# Patient Record
Sex: MALE | Race: WHITE | Employment: OTHER | URBAN - METROPOLITAN AREA
[De-identification: names, ages, dates, MRNs, and addresses within clinical notes are randomized per-mention and may not be internally consistent; named-entity substitution may affect disease eponyms.]

---

## 2018-04-26 ENCOUNTER — OFFICE VISIT (OUTPATIENT)
Dept: DERMATOLOGY | Age: 78
End: 2018-04-26

## 2018-04-26 DIAGNOSIS — L82.1 SEBORRHEIC KERATOSES: ICD-10-CM

## 2018-04-26 DIAGNOSIS — D48.9 NEOPLASM OF UNCERTAIN BEHAVIOR: Primary | ICD-10-CM

## 2018-04-26 DIAGNOSIS — D22.9 MULTIPLE BENIGN MELANOCYTIC NEVI: ICD-10-CM

## 2018-04-26 DIAGNOSIS — L57.8 DIFFUSE PHOTOAGING OF SKIN: ICD-10-CM

## 2018-04-26 DIAGNOSIS — Z85.828 HISTORY OF NONMELANOMA SKIN CANCER: ICD-10-CM

## 2018-04-26 DIAGNOSIS — L57.0 MULTIPLE ACTINIC KERATOSES: ICD-10-CM

## 2018-04-26 DIAGNOSIS — Z78.9 NON-TOBACCO USER: ICD-10-CM

## 2018-04-26 PROCEDURE — 1123F ACP DISCUSS/DSCN MKR DOCD: CPT | Performed by: DERMATOLOGY

## 2018-04-26 PROCEDURE — 4040F PNEUMOC VAC/ADMIN/RCVD: CPT | Performed by: DERMATOLOGY

## 2018-04-26 PROCEDURE — G8427 DOCREV CUR MEDS BY ELIG CLIN: HCPCS | Performed by: DERMATOLOGY

## 2018-04-26 PROCEDURE — 99203 OFFICE O/P NEW LOW 30 MIN: CPT | Performed by: DERMATOLOGY

## 2018-04-26 PROCEDURE — 17004 DESTROY PREMAL LESIONS 15/>: CPT | Performed by: DERMATOLOGY

## 2018-04-26 PROCEDURE — G8421 BMI NOT CALCULATED: HCPCS | Performed by: DERMATOLOGY

## 2018-04-26 PROCEDURE — 11101 PR BIOPSY, EACH ADDED LESION: CPT | Performed by: DERMATOLOGY

## 2018-04-26 PROCEDURE — 1036F TOBACCO NON-USER: CPT | Performed by: DERMATOLOGY

## 2018-04-26 PROCEDURE — 11100 PR BIOPSY OF SKIN LESION: CPT | Performed by: DERMATOLOGY

## 2018-04-26 RX ORDER — QUINAPRIL HCL AND HYDROCHLOROTHIAZIDE 20; 12.5 MG/1; MG/1
TABLET ORAL
COMMUNITY
Start: 2018-02-15

## 2018-04-26 RX ORDER — METFORMIN HYDROCHLORIDE 500 MG/1
TABLET, EXTENDED RELEASE ORAL
COMMUNITY
Start: 2018-02-14

## 2018-04-26 RX ORDER — GABAPENTIN 300 MG/1
CAPSULE ORAL
COMMUNITY
Start: 2016-09-27

## 2018-04-26 RX ORDER — IBUPROFEN 200 MG
400 TABLET ORAL 2 TIMES DAILY
COMMUNITY

## 2018-05-01 ENCOUNTER — TELEPHONE (OUTPATIENT)
Dept: DERMATOLOGY | Age: 78
End: 2018-05-01

## 2018-05-01 DIAGNOSIS — C44.91 BASAL CELL CARCINOMA, UNSPECIFIED SITE: Primary | ICD-10-CM

## 2018-05-01 DIAGNOSIS — C44.92 SCC (SQUAMOUS CELL CARCINOMA): ICD-10-CM

## 2018-05-24 ENCOUNTER — PROCEDURE VISIT (OUTPATIENT)
Dept: SURGERY | Age: 78
End: 2018-05-24

## 2018-05-24 VITALS
WEIGHT: 218 LBS | DIASTOLIC BLOOD PRESSURE: 77 MMHG | SYSTOLIC BLOOD PRESSURE: 160 MMHG | HEART RATE: 60 BPM | OXYGEN SATURATION: 96 % | TEMPERATURE: 97.8 F

## 2018-05-24 DIAGNOSIS — C44.311 BASAL CELL CARCINOMA OF NOSE: Primary | ICD-10-CM

## 2018-05-24 PROCEDURE — 17311 MOHS 1 STAGE H/N/HF/G: CPT | Performed by: DERMATOLOGY

## 2018-05-24 PROCEDURE — 14060 TIS TRNFR E/N/E/L 10 SQ CM/<: CPT | Performed by: DERMATOLOGY

## 2018-05-24 PROCEDURE — 17312 MOHS ADDL STAGE: CPT | Performed by: DERMATOLOGY

## 2018-05-25 ENCOUNTER — TELEPHONE (OUTPATIENT)
Dept: SURGERY | Age: 78
End: 2018-05-25

## 2018-05-31 ENCOUNTER — PROCEDURE VISIT (OUTPATIENT)
Dept: SURGERY | Age: 78
End: 2018-05-31

## 2018-05-31 VITALS
DIASTOLIC BLOOD PRESSURE: 78 MMHG | OXYGEN SATURATION: 96 % | WEIGHT: 216 LBS | HEART RATE: 66 BPM | SYSTOLIC BLOOD PRESSURE: 148 MMHG | TEMPERATURE: 97.9 F

## 2018-05-31 DIAGNOSIS — C44.319 BASAL CELL CARCINOMA OF LEFT CHEEK: ICD-10-CM

## 2018-05-31 DIAGNOSIS — Z48.02 VISIT FOR SUTURE REMOVAL: ICD-10-CM

## 2018-05-31 DIAGNOSIS — C44.619 BASAL CELL CARCINOMA OF LEFT HAND: Primary | ICD-10-CM

## 2018-05-31 PROCEDURE — 17312 MOHS ADDL STAGE: CPT | Performed by: DERMATOLOGY

## 2018-05-31 PROCEDURE — 12042 INTMD RPR N-HF/GENIT2.6-7.5: CPT | Performed by: DERMATOLOGY

## 2018-05-31 PROCEDURE — 17311 MOHS 1 STAGE H/N/HF/G: CPT | Performed by: DERMATOLOGY

## 2018-05-31 PROCEDURE — 13132 CMPLX RPR F/C/C/M/N/AX/G/H/F: CPT | Performed by: DERMATOLOGY

## 2018-06-01 ENCOUNTER — TELEPHONE (OUTPATIENT)
Dept: SURGERY | Age: 78
End: 2018-06-01

## 2018-06-07 ENCOUNTER — PROCEDURE VISIT (OUTPATIENT)
Dept: SURGERY | Age: 78
End: 2018-06-07

## 2018-06-07 VITALS
OXYGEN SATURATION: 97 % | TEMPERATURE: 98.3 F | HEART RATE: 64 BPM | SYSTOLIC BLOOD PRESSURE: 138 MMHG | DIASTOLIC BLOOD PRESSURE: 79 MMHG | WEIGHT: 216 LBS

## 2018-06-07 DIAGNOSIS — C44.329 SQUAMOUS CELL CARCINOMA OF SKIN OF RIGHT CHEEK: Primary | ICD-10-CM

## 2018-06-07 DIAGNOSIS — D04.4 SQUAMOUS CELL CARCINOMA IN SITU OF SCALP: ICD-10-CM

## 2018-06-07 PROCEDURE — 12052 INTMD RPR FACE/MM 2.6-5.0 CM: CPT | Performed by: DERMATOLOGY

## 2018-06-07 PROCEDURE — 17311 MOHS 1 STAGE H/N/HF/G: CPT | Performed by: DERMATOLOGY

## 2018-06-07 PROCEDURE — 12041 INTMD RPR N-HF/GENIT 2.5CM/<: CPT | Performed by: DERMATOLOGY

## 2018-06-07 RX ORDER — CEPHALEXIN 500 MG/1
CAPSULE ORAL
Qty: 21 CAPSULE | Refills: 0 | Status: SHIPPED | OUTPATIENT
Start: 2018-06-07 | End: 2018-11-01 | Stop reason: ALTCHOICE

## 2018-06-14 ENCOUNTER — NURSE ONLY (OUTPATIENT)
Dept: SURGERY | Age: 78
End: 2018-06-14

## 2018-06-14 DIAGNOSIS — Z48.02 VISIT FOR SUTURE REMOVAL: Primary | ICD-10-CM

## 2018-06-28 ENCOUNTER — NURSE ONLY (OUTPATIENT)
Dept: SURGERY | Age: 78
End: 2018-06-28

## 2018-06-28 DIAGNOSIS — Z48.02 VISIT FOR SUTURE REMOVAL: Primary | ICD-10-CM

## 2018-07-26 ENCOUNTER — OFFICE VISIT (OUTPATIENT)
Dept: DERMATOLOGY | Age: 78
End: 2018-07-26

## 2018-07-26 DIAGNOSIS — D22.9 MULTIPLE BENIGN MELANOCYTIC NEVI: ICD-10-CM

## 2018-07-26 DIAGNOSIS — D48.9 NEOPLASM OF UNCERTAIN BEHAVIOR: Primary | ICD-10-CM

## 2018-07-26 DIAGNOSIS — L57.8 PHOTOAGING OF SKIN: ICD-10-CM

## 2018-07-26 DIAGNOSIS — L82.1 SEBORRHEIC KERATOSIS: ICD-10-CM

## 2018-07-26 DIAGNOSIS — L57.0 MULTIPLE ACTINIC KERATOSES: ICD-10-CM

## 2018-07-26 PROCEDURE — G8427 DOCREV CUR MEDS BY ELIG CLIN: HCPCS | Performed by: DERMATOLOGY

## 2018-07-26 PROCEDURE — 1036F TOBACCO NON-USER: CPT | Performed by: DERMATOLOGY

## 2018-07-26 PROCEDURE — 1123F ACP DISCUSS/DSCN MKR DOCD: CPT | Performed by: DERMATOLOGY

## 2018-07-26 PROCEDURE — 11100 PR BIOPSY OF SKIN LESION: CPT | Performed by: DERMATOLOGY

## 2018-07-26 PROCEDURE — G8421 BMI NOT CALCULATED: HCPCS | Performed by: DERMATOLOGY

## 2018-07-26 PROCEDURE — 1101F PT FALLS ASSESS-DOCD LE1/YR: CPT | Performed by: DERMATOLOGY

## 2018-07-26 PROCEDURE — 11101 PR BIOPSY, EACH ADDED LESION: CPT | Performed by: DERMATOLOGY

## 2018-07-26 PROCEDURE — 17004 DESTROY PREMAL LESIONS 15/>: CPT | Performed by: DERMATOLOGY

## 2018-07-26 PROCEDURE — 4040F PNEUMOC VAC/ADMIN/RCVD: CPT | Performed by: DERMATOLOGY

## 2018-07-26 PROCEDURE — 99213 OFFICE O/P EST LOW 20 MIN: CPT | Performed by: DERMATOLOGY

## 2018-07-26 NOTE — PATIENT INSTRUCTIONS
Sun Protection Tips    · Apply broad spectrum water resistant sunscreen with an SPF of at least 30 to exposed areas of the skin. Dont forget the ears and lips! Remember to reapply sunscreen about every 2 hours and after swimming or sweating. · Wear sun protective clothing. Swim shirts (aka. rash guards) are a great idea and negates the need to reapply sunscreen in those areas. · Seek the shade whenever possible especially between the hours of 10am and 4pm when the suns rays are the strongest.     · Avoid tanning beds  · Hats with a wide brim    Biopsy Wound Care Instructions   Cleanse the wound with mild soapy water daily.  Gently dry the area.  Apply Vaseline or petroleum jelly to the wound using a cotton tipped applicator or Qtip.  Cover with a clean bandage.  Repeat this process until the biopsy site is healed.  If you had stitches placed, continue treating the site until the stitches are removed. Remember to make an appointment to return to have your stitches removed by our staff.  You may shower and bathe as usual.   ** Biopsy results generally take around 7 business days to come back. If you have not heard from us by then, please call the office at [phone] between 8AM and 4PM Monday through Friday. Cryosurgery (Freezing) Wound Care Instructions    AFTER THE PROCEDURE:    You will notice swelling and redness around the site. This is normal.    You may experience a sharp or sore feeling for the next several days. For this discomfort, you may take acetaminophen (Tylenol©).  A blister may develop at the treated area, sometimes as soon as by the end of the day. After several days, the blister will subside and a scab will form.  If the area is bumped or traumatized during the first few days following freezing, you may develop bleeding into the blister, forming a blood blister. This is nothing to be alarmed about.    If the blister is tense, uncomfortable, or much larger

## 2018-07-26 NOTE — PROGRESS NOTES
general sense of well-being   Skin: No new or changing moles, no history of keloids or hypertrophic scars, no interval of severe sunburns  Heme: No abnormal bruising or bleeding. Past Medical History, Family History, Surgical History, Medications and Allergies reviewed. Past Skin Hx:  Diagnosed following skin cancers on 4/26/18 and all treated with Mohs surgery by Dr. 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 No pertinent family history. Past Medical History:   Diagnosis Date    BCC (basal cell carcinoma of skin)     SCC (squamous cell carcinoma)      Past Surgical History:   Procedure Laterality Date    MOHS SURGERY         Allergies   Allergen Reactions    Morphine      No outpatient prescriptions have been marked as taking for the 7/26/18 encounter (Office Visit) with Jimmy Farmer DO. Social History:   Social History     Social History    Marital status:      Spouse name: N/A    Number of children: N/A    Years of education: N/A     Occupational History    Not on file. Social History Main Topics    Smoking status: Never Smoker    Smokeless tobacco: Never Used    Alcohol use Not on file    Drug use: Unknown    Sexual activity: Not on file     Other Topics Concern    Not on file     Social History Narrative    No narrative on file       Physical Examination     The following were examined and determined to be normal: Psych/Neuro, Conjunctivae/eyelids, Gums/teeth/lips, Abdomen, Back and Nails/digits. LLE, RLE Groin/buttocks. Genitalia not examined.     The following were examined and determined to be abnormal: Scalp/hair, Head/face, Neck, Breast/axilla/chest, RUE and LUE    -General: NAD, exposure leads to increased risk for skin cancers and to have at least annual skin exams (earlier if indicated) in the office.   -The patient was counseled regarding sun protective practices such as sunscreen use (water resistant, broad spectrum sunscreen with SPF rating of at least 30) and need for reapplication at least every 2 hours, sun protective clothing (including hat and sunglasses), avoidance of sun during the peak hours of the day, and avoidance of tanning beds. 5. Seborrheic keratosis  Patient educated that seborrheic keratoses have no malignant potential.    -Reassurance provided to the patient regarding their chronic and benign nature. No treatment performed        Note is transcribed using voice recognition software. Inadvertent computerized transcription errors may be present.     Return in about 3 months (around 10/26/2018) for sun exposed exam.

## 2018-08-03 ENCOUNTER — TELEPHONE (OUTPATIENT)
Dept: DERMATOLOGY | Age: 78
End: 2018-08-03

## 2018-08-03 DIAGNOSIS — D04.30 BOWEN'S DISEASE OF FACE: Primary | ICD-10-CM

## 2018-08-03 NOTE — LETTER
[facility]  [address] [address]  Phone: [phone]  Fax: [address], [place]        August 8, 2018    [facility] [address]      Dear Tommie Sotoer: [facility]  [address] [address]  [phone]    Dear Patient:    Lyndsay Espitia have recently been scheduled for a procedure with Dr. Concepcion 9/6/18 arrive 3pm at our [facility] office. If you are being transported from an assisted living facility and have any medical conditions that may hinder your ability to understand and sign a consent form, please bring a family member or caregiver with durable power of . Please see the pre-operative instructions below:    ? You should take your regular medications as prescribed by your other physicians unless otherwise directed. ? Certain medications may increase your propensity to bleed during and after your surgery. Please stop taking NSAIDS (Aleve, naprosyn, Ibuprofen, Motrin) 3 days preoperatively and 1 day postoperatively if possible. Vitamin E supplements and herbal preparations should be stopped at least 1 week prior to surgery. ? You may have normal meals the day of your procedure. ? A bath or shower the morning of or evening prior is recommended. You will need to keep your surgical site dry for 48 hours after the procedure. · To decrease the risk of infection, please use an antibacterial soap for the 3 days prior to surgery. ? Do not apply any makeup, creams, lotions, after-shave, or perfumes to the surgery site the day of the procedure. ? The surgery will be performed with local anesthesia only so there are no restrictions for driving home.       ? Numbness from the local anesthesia wears off within a few hours after surgery. Most patients do not experience significant pain after surgery and acetaminophen (Tylenol) is usually sufficient to control discomfort. ? Bruising and swelling can be common if surgery is being performed on the face and typically resolves within a week. ? A bulky pressure dressing will need to be kept in place and dry for 48 hours. Wound care instructions will be provided and reviewed on the day of surgery. ? Sutures are removed between 6 and 14 days following surgery. ? If your surgical site is located on the scalp, please do not have your hair professionally styled or colored until after the sutures are removed. Please do not drink alcoholic beverages one day before and for one day after surgery. ? Please stop smoking if possible as it is disruptive to the healing process. ? Vigorous physical activity or exercise is not recommended for the 1 to 2 weeks following the surgery due to risk of bleeding, wound dehiscence, and poor scar appearance. Thank you,    The Dermatology Staff of [facility])        If you have any questions or concerns, please don't hesitate to call.     Sincerely,        Matt Cordero, DO

## 2018-08-21 ENCOUNTER — PROCEDURE VISIT (OUTPATIENT)
Dept: SURGERY | Age: 78
End: 2018-08-21

## 2018-08-21 VITALS
HEART RATE: 98 BPM | SYSTOLIC BLOOD PRESSURE: 146 MMHG | TEMPERATURE: 98.1 F | WEIGHT: 214 LBS | DIASTOLIC BLOOD PRESSURE: 76 MMHG | OXYGEN SATURATION: 97 %

## 2018-08-21 DIAGNOSIS — D04.39 SQUAMOUS CELL CARCINOMA IN SITU (SCCIS) OF SKIN OF LEFT CHEEK: Primary | ICD-10-CM

## 2018-08-21 PROCEDURE — 13132 CMPLX RPR F/C/C/M/N/AX/G/H/F: CPT | Performed by: DERMATOLOGY

## 2018-08-21 PROCEDURE — 17311 MOHS 1 STAGE H/N/HF/G: CPT | Performed by: DERMATOLOGY

## 2018-08-21 NOTE — PROGRESS NOTES
PRE-PROCEDURE SCREENING    Pacemaker/ICD: No  Difficulty with numbing in the past: No  Local Anesthesia Reaction/passing out: No  Latex or adhesive allergy:  No  Bleeding/Clotting Disorders: No  Anticoagulant Therapy: Yes, ASA  Joint prosthesis: No  Artificial Heart Valve: No  Stroke or Seizures: No  Organ Transplant or Lymphoma: No  Immunosuppression: No  Respiratory Problems: No

## 2018-08-21 NOTE — PROGRESS NOTES
PRE-PROCEDURE SCREENING    Pacemaker/ICD: No  Difficulty with numbing in the past: No  Local Anesthesia Reaction/passing out: No  Latex or adhesive allergy:  No  Bleeding/Clotting Disorders: No  Anticoagulant Therapy: Yes, ASA  Joint prosthesis: No  Artificial Heart Valve: No  Stroke or Seizures: No  Organ Transplant or Lymphoma: No  Immunosuppression: No  Respiratory Problems: No
margin of normal-appearing skin, using the technique of Mohs. A map was prepared to correspond to the area of skin from which it was excised. Hemostasis was achieved using electrosurgery. The wound was bandaged. The tissue was prepared for the cryostat and sectioned. 1 section(s) prepared. Each section was coded, cut, and stained for microscopic examination. The entire base and margins of the excised piece of tissue were examined by the surgeon. No tumor was identified at the peripheral margins of stage I of microscopically controlled surgery. DEFECT MANAGEMENT:    REPAIR DESCRIPTION:  Various closure modalities were discussed with the patient, and it was decided that a complex repair would best preserve normal anatomic and functional relationships. Additional risk of wound dehiscence was discussed. The patient was placed on the procedure room table. The area was anesthetized with 1% lidocaine with epinephrine 1:100,000 buffered, was given a sterile prep using Chlorhexidine gluconate 4% solution, and draped in the usual sterile fashion. Recreation and enlargement of the wound was performed by excising cones of tissue via the triangulation technique. The final incision lines were placed with respect for the patient's natural skin tension lines in a cresentic configuration to avoid functional and aesthetic distortion of adjacent free margins. Following extensive undermining, meticulous hemostasis was obtained with spot monopolar electrocoagulation. Subcutaneous dead space and dermis were closed using 5-0 Vicryl buried subcutaneous interrupted suture and the epidermis was approximated with  liquid skin adhesive. WOUND COVERAGE:  The wound was cleaned with normal saline solution, dried off, Aquaphor ointment was applied, and the wound was covered. A dressing was applied for stabilization and light pressure.   The patient was given detailed oral and written instructions on postoperative

## 2018-08-22 ENCOUNTER — TELEPHONE (OUTPATIENT)
Dept: SURGERY | Age: 78
End: 2018-08-22

## 2018-09-06 ENCOUNTER — PROCEDURE VISIT (OUTPATIENT)
Dept: DERMATOLOGY | Age: 78
End: 2018-09-06

## 2018-09-06 VITALS — SYSTOLIC BLOOD PRESSURE: 124 MMHG | WEIGHT: 211 LBS | DIASTOLIC BLOOD PRESSURE: 66 MMHG

## 2018-09-06 DIAGNOSIS — D04.4 BOWEN'S DISEASE OF NECK: ICD-10-CM

## 2018-09-06 DIAGNOSIS — L57.0 MULTIPLE ACTINIC KERATOSES: ICD-10-CM

## 2018-09-06 DIAGNOSIS — C44.91 NODULAR BASAL CELL CARCINOMA: Primary | ICD-10-CM

## 2018-09-06 PROCEDURE — 11602 EXC TR-EXT MAL+MARG 1.1-2 CM: CPT | Performed by: DERMATOLOGY

## 2018-09-06 PROCEDURE — 12032 INTMD RPR S/A/T/EXT 2.6-7.5: CPT | Performed by: DERMATOLOGY

## 2018-09-06 PROCEDURE — 17272 DSTR MAL LES S/N/H/F/G 1.1-2: CPT | Performed by: DERMATOLOGY

## 2018-09-06 PROCEDURE — 17004 DESTROY PREMAL LESIONS 15/>: CPT | Performed by: DERMATOLOGY

## 2018-09-06 NOTE — PATIENT INSTRUCTIONS
notice swelling and redness around the site. This is normal.    You may experience a sharp or sore feeling for the next several days. For this discomfort, you may take acetaminophen (Tylenol©).  A blister may develop at the treated area, sometimes as soon as by the end of the day. After several days, the blister will subside and a scab will form.  If the area is bumped or traumatized during the first few days following freezing, you may develop bleeding into the blister, forming a blood blister. This is nothing to be alarmed about.  If the blister is tense, uncomfortable, or much larger than the site that was frozen, you may pop the blister along its edge with a sterile needle (boiled, heated under a flame, or cleaned with alcohol) to allow the fluid to drain out. If the blister does not bother you, no treatment is needed.  Do NOT peel off the top of the blister roof. It will act as a dressing on top of your wound. WOUND CARE:    You may shower or bathe as usual, but avoid scrubbing the areas that have been frozen.  Cleanse the site twice a day with mild soapy water, and then apply a thin film of white petrolatum (Vaseline©).  You do not need to cover the area, but can if you prefer.  Do NOT allow the site to become dry or crusted, or attempt to dry it out with rubbing alcohol or hydrogen peroxide.  Continue this regimen until the area is pink and healed. Depending on the size and location of your cryosurgery site, healing may take 2 to 4 weeks.  The area may continue to be pink for several weeks, and over the next few months may become darker or lighter than the surrounding skin. This may be a permanent change.

## 2018-09-06 NOTE — PROGRESS NOTES
[facility]) Dermatology  [place], , [address]     Su Headley  1940    66 y.o. male     Date of Visit: 2018    Chief Complaint:   Chief Complaint   Patient presents with    Procedure     exc NBCC left proximal posterior upper arm -cryo ak right doral hand, and ed&c right neck carolee        History of Present Illness:  Su Headley is a 66 y.o. male who presents with the chief complaint of follow up for the followin. Biopsy proven nodular BCC located on left proximal posterior upper arm, presents today for surgical excision. No concerns since biopsy. Pacemaker/ICD: No  Joint prosthesis: No  Difficulty with numbing in the past: No  Local Anesthesia Reaction: No  Artificial Heart Valve: No  Organ Transplant: No  Immunosuppression: No  Bleeding/Clotting Disorders: No  Anticoagulant Therapy: Yes, ASA 81mg    2. Biopsy proven Bowen's disease located to right neck, presents today for electrodesiccation and curettage. No concerns since biopsy. 3. Biopsy-proven actinic keratosis located to right dorsal hand, patient presents today for cryotherapy to remaining lesion. No concerns since biopsy. Has multiple scaly irritated bumps to hands and forearms that he would like evaluated as well for possible additional pre-cancers. Review of Systems:  Constitutional: Reports general sense of well-being   Skin: No new or changing moles, no history of keloids or hypertrophic scars. Heme: No abnormal bruising or bleeding. Past Medical History, Family History, Surgical History, Medications and Allergies reviewed. PSHx: gave fresh hand picked eggs       History reviewed. No pertinent family history.   Past Medical History:   Diagnosis Date    BCC (basal cell carcinoma of skin)     SCC (squamous cell carcinoma)      Past Surgical History:   Procedure Laterality Date    MOHS SURGERY         Allergies   Allergen Reactions    Morphine      Outpatient Prescriptions Marked

## 2018-09-11 LAB — DERMATOLOGY PATHOLOGY REPORT: ABNORMAL

## 2018-09-18 ENCOUNTER — NURSE ONLY (OUTPATIENT)
Dept: DERMATOLOGY | Age: 78
End: 2018-09-18

## 2018-09-18 DIAGNOSIS — Z48.02 VISIT FOR SUTURE REMOVAL: ICD-10-CM

## 2018-09-18 PROCEDURE — 99024 POSTOP FOLLOW-UP VISIT: CPT | Performed by: DERMATOLOGY

## 2018-11-01 ENCOUNTER — OFFICE VISIT (OUTPATIENT)
Dept: DERMATOLOGY | Age: 78
End: 2018-11-01
Payer: MEDICARE

## 2018-11-01 DIAGNOSIS — L57.8 DIFFUSE PHOTOAGING OF SKIN: ICD-10-CM

## 2018-11-01 DIAGNOSIS — L57.0 MULTIPLE ACTINIC KERATOSES: ICD-10-CM

## 2018-11-01 DIAGNOSIS — D48.9 NEOPLASM OF UNCERTAIN BEHAVIOR: Primary | ICD-10-CM

## 2018-11-01 DIAGNOSIS — Z85.828 HISTORY OF NONMELANOMA SKIN CANCER: ICD-10-CM

## 2018-11-01 PROCEDURE — 99213 OFFICE O/P EST LOW 20 MIN: CPT | Performed by: DERMATOLOGY

## 2018-11-01 PROCEDURE — 11101 PR BIOPSY, EACH ADDED LESION: CPT | Performed by: DERMATOLOGY

## 2018-11-01 PROCEDURE — 4040F PNEUMOC VAC/ADMIN/RCVD: CPT | Performed by: DERMATOLOGY

## 2018-11-01 PROCEDURE — 1101F PT FALLS ASSESS-DOCD LE1/YR: CPT | Performed by: DERMATOLOGY

## 2018-11-01 PROCEDURE — 17004 DESTROY PREMAL LESIONS 15/>: CPT | Performed by: DERMATOLOGY

## 2018-11-01 PROCEDURE — 11100 PR BIOPSY OF SKIN LESION: CPT | Performed by: DERMATOLOGY

## 2018-11-01 PROCEDURE — G8484 FLU IMMUNIZE NO ADMIN: HCPCS | Performed by: DERMATOLOGY

## 2018-11-01 PROCEDURE — G8421 BMI NOT CALCULATED: HCPCS | Performed by: DERMATOLOGY

## 2018-11-01 PROCEDURE — 1123F ACP DISCUSS/DSCN MKR DOCD: CPT | Performed by: DERMATOLOGY

## 2018-11-01 PROCEDURE — 1036F TOBACCO NON-USER: CPT | Performed by: DERMATOLOGY

## 2018-11-01 PROCEDURE — G8427 DOCREV CUR MEDS BY ELIG CLIN: HCPCS | Performed by: DERMATOLOGY

## 2018-11-01 NOTE — PROGRESS NOTES
[facility]) Dermatology  [facility], [place], [address]       Sajan Cowan  1940    66 y.o. male     Date of Visit: 2018    Chief Complaint:   Chief Complaint   Patient presents with    Follow-up    Skin Exam     3 month sun exposed areas        I was asked to see this patient by Dr. Payton ref. provider found. History of Present Illness:  Sajan Cowan is a 66 y.o. male who presents with the chief complaint of follow up for the followin. 3 month skin check to sun exposed areas. History of multiple basal cell carcinomas and SCCIS/SCC, patient denies recurrence. 2. Hx of AKs treated with cryotherapy, patient denies recurrence. Dates he has several thick rough spots to his forehead and scalp that he is concerned for additional precancers. 3.Progressive freckling and lentigines located to sun exposed areas, Admits to sun exposure in youth without wearing sunscreen, hats, or protective clothing.  Works outdoors often, Denies regularly wearing sunscreen or protective hats/clothing when outdoors currently. Patient was advised to use topical Efudex in 2017 by [facility] dermatology department as well as to schedule for PDT phototherapy. Isela Phillips declined at that time and wanted to switch doctors. Review of Systems:  Constitutional: Reports general sense of well-being   Skin: No new or changing moles, no history of keloids or hypertrophic scars, no interval of severe sunburns  Heme: No abnormal bruising or bleeding. Past Medical History, Family History, Surgical History, Medications and Allergies reviewed.     Past Skin Hx:  18-BCC on Left proximal posterior upper arm surgically excised   18- Bowen's disease located to right neck treated with [facility]  18- Bowen's disease located to Left inferior lateral cheek treated with Mohs surgery  Diagnosed following skin cancers on 18 and all treated with Mohs surgery by Dr. Steve Palomo:  Pigmented milian's erythematous scaly papule  2.  ill defined irreg shaped gritty keratotic pink macule(s) and papules located to Scalp diffusely, forehead, helixes of both ears, left cheek, right cheek, right dorsal hand and forearm, left dorsal forearm  3. Face, neck, chest, upper extremities-Scattered dyspigmentation with multiple lentigines and vascular change consistent with chronic sun exposure  4. Left proximal posterior upper arm, right neck, left inferior lateral cheek, vertex scalp, left dorsal hand, left nasal ala, left and right lateral cheeks- well healed scar at site of prior nonmelanoma skin cancer, no evidence of recurrence    Assessment and Plan     1. Neoplasm of uncertain behavior    2. Multiple actinic keratoses    3. Diffuse photoaging of skin    4. History of nonmelanoma skin cancer        1. Neoplasm of uncertain behavior  DDx:  1a) rule out NMSC  1b) HAK rule out NMSC  -Discussed possible diagnosis. Patient agreeable to biopsy. Verbal consent obtained after risks (infection, bleeding, scar), benefits and alternatives explained. -Area(s) to be biopsied were marked with a surgical pen. Site(s) were cleansed with alcohol. Local anesthesia achieved with 1% lidocaine with epinephrine/sodium bicarbonate. Shave biopsy performed with a razor blade. Hemostasis was achieved with aluminum chloride and electrodessication. The wound(s) were dressed with petrolatum and covered with a bandage. Specimen(s) sent to pathology. Pt educated re: risk of bleeding, infection, scar and wound care instructions.    - HI BIOPSY OF SKIN LESION  - HI BIOPSY, EACH ADDED LESION    RTC 3 months    2. Multiple actinic keratoses  -Edu re: relationship with chronic cumulative sun exposure, low premalignant potential.   -30 lesion(s) treated w/ liquid nitrogen x 1 cycles, 3 seconds each located to  Scalp diffusely, forehead, helixes of both ears, left cheek, right cheek, right dorsal hand and forearm, left dorsal forearm.  Edu re: risk of blister formation, discomfort, scar, hypopigmentation. Discussed wound care. -Reviewed sun protective behavior -- sun avoidance during the peak hours of the day, sun-protective clothing (including hat and sunglasses), sunscreen use (water resistant, broad spectrum, SPF at least 30, need for reapplication every 2 to 3 hours). -Patient to contact office if AK fails to resolve despite treatment, or if patient develops side effect from therapy, such as unbearable crusting, scabbing, redness, or tenderness.    - IL DESTRUC PREMALIGNANT,15+ LESIONS    3. Diffuse photoaging of skin  -Patient's skin showing evidence of chronic sun exposure leading to diffuse photodamage and photo-aging  -Educated patient that chronic sun exposure leads to increased risk for skin cancers and to have at least annual skin exams (earlier if indicated) in the office.   -The patient was counseled regarding sun protective practices such as sunscreen use (water resistant, broad spectrum sunscreen with SPF rating of at least 30) and need for reapplication at least every 2 hours, sun protective clothing (including hat and sunglasses), avoidance of sun during the peak hours of the day, and avoidance of tanning beds. 4. History of nonmelanoma skin cancer  No evidence of recurrence  RTC 3 months        Note is transcribed using voice recognition software. Inadvertent computerized transcription errors may be present.     Return in about 3 months (around 2/1/2019) for ak f/u skin exam.

## 2018-11-05 LAB — DERMATOLOGY PATHOLOGY REPORT: ABNORMAL

## 2018-11-19 ENCOUNTER — PROCEDURE VISIT (OUTPATIENT)
Dept: SURGERY | Age: 78
End: 2018-11-19
Payer: MEDICARE

## 2018-11-19 VITALS — WEIGHT: 204 LBS

## 2018-11-19 DIAGNOSIS — C44.329 SQUAMOUS CELL CARCINOMA OF SKIN OF LEFT CHEEK: Primary | ICD-10-CM

## 2018-11-19 PROCEDURE — 17311 MOHS 1 STAGE H/N/HF/G: CPT | Performed by: DERMATOLOGY

## 2018-11-19 PROCEDURE — 12052 INTMD RPR FACE/MM 2.6-5.0 CM: CPT | Performed by: DERMATOLOGY

## 2018-11-19 PROCEDURE — 17312 MOHS ADDL STAGE: CPT | Performed by: DERMATOLOGY

## 2018-11-19 NOTE — PATIENT INSTRUCTIONS
minutes without peeking. If the bleeding continues, apply pressure for 20 minutes more. ? If the bleeding does not stop after you apply pressure, call us right away. If you cant call, go to the nearest emergency room or urgent care facility. What to Expect:  You may have these symptoms. They are normal and should get better with time:        1. Swelling. Swelling usually increases for 24 to 48 hours after your procedure and then begins to improve. Some soreness and redness around your wound. 2.  Bruising, which could last 1 week or more. 3.  Pink and bumpy appearance to the scar. This may happen a few weeks after your procedure. After 4 weeks, you may gently massage the area each day with a facial moisturizer or petroleum jelly (Vaseline) or Aquaphor. This will help to smooth the skin and improve the appearance of the scar. The color of your scar will fade over time, but may be pink for several months after the procedure. The scar may take 6 months to 1 year to reach its final color and appearance. 4. Spitting suture. Occasionally, an inside suture (stitch) does not completely dissolve. When this happens, (generally 4-8 weeks after surgery), it causes a bump or pimple to form on the scar. This is easily removed and is not at all serious. It does not mean the skin cancer has returned. Contact us if it happens, but do not be alarmed. Vitamin E oil is NOT necessary. A good moisturizer is just as effective. Sunscreen IS necessary. Use at least an SPF 30 sunscreen daily- even in the winter.      Call us at [phone] right away if you have any of the following symptoms:   Bleeding that you cant stop (see above)   Pain that lasts longer than 48 hours   Your wound becomes more painful, red or hot   Bruising and swelling that does not improve within 48 hours or gets worse suddenly

## 2018-11-20 ENCOUNTER — TELEPHONE (OUTPATIENT)
Dept: SURGERY | Age: 78
End: 2018-11-20

## 2019-02-07 ENCOUNTER — OFFICE VISIT (OUTPATIENT)
Dept: DERMATOLOGY | Age: 79
End: 2019-02-07
Payer: MEDICARE

## 2019-02-07 DIAGNOSIS — Z85.828 HISTORY OF NONMELANOMA SKIN CANCER: ICD-10-CM

## 2019-02-07 DIAGNOSIS — D48.9 NEOPLASM OF UNCERTAIN BEHAVIOR: ICD-10-CM

## 2019-02-07 DIAGNOSIS — L57.0 MULTIPLE ACTINIC KERATOSES: Primary | ICD-10-CM

## 2019-02-07 PROCEDURE — 11102 TANGNTL BX SKIN SINGLE LES: CPT | Performed by: DERMATOLOGY

## 2019-02-07 PROCEDURE — 17004 DESTROY PREMAL LESIONS 15/>: CPT | Performed by: DERMATOLOGY

## 2019-02-11 LAB — DERMATOLOGY PATHOLOGY REPORT: NORMAL

## 2019-05-09 ENCOUNTER — OFFICE VISIT (OUTPATIENT)
Dept: DERMATOLOGY | Age: 79
End: 2019-05-09
Payer: MEDICARE

## 2019-05-09 DIAGNOSIS — L57.8 DIFFUSE PHOTOAGING OF SKIN: ICD-10-CM

## 2019-05-09 DIAGNOSIS — Z85.828 HISTORY OF NONMELANOMA SKIN CANCER: ICD-10-CM

## 2019-05-09 DIAGNOSIS — D48.9 NEOPLASM OF UNCERTAIN BEHAVIOR: ICD-10-CM

## 2019-05-09 DIAGNOSIS — D22.9 MULTIPLE BENIGN NEVI: ICD-10-CM

## 2019-05-09 DIAGNOSIS — L57.0 MULTIPLE ACTINIC KERATOSES: Primary | ICD-10-CM

## 2019-05-09 PROCEDURE — 1123F ACP DISCUSS/DSCN MKR DOCD: CPT | Performed by: DERMATOLOGY

## 2019-05-09 PROCEDURE — 11102 TANGNTL BX SKIN SINGLE LES: CPT | Performed by: DERMATOLOGY

## 2019-05-09 PROCEDURE — 11103 TANGNTL BX SKIN EA SEP/ADDL: CPT | Performed by: DERMATOLOGY

## 2019-05-09 PROCEDURE — G8427 DOCREV CUR MEDS BY ELIG CLIN: HCPCS | Performed by: DERMATOLOGY

## 2019-05-09 PROCEDURE — G8421 BMI NOT CALCULATED: HCPCS | Performed by: DERMATOLOGY

## 2019-05-09 PROCEDURE — 1036F TOBACCO NON-USER: CPT | Performed by: DERMATOLOGY

## 2019-05-09 PROCEDURE — 17004 DESTROY PREMAL LESIONS 15/>: CPT | Performed by: DERMATOLOGY

## 2019-05-09 PROCEDURE — 4040F PNEUMOC VAC/ADMIN/RCVD: CPT | Performed by: DERMATOLOGY

## 2019-05-09 PROCEDURE — 99213 OFFICE O/P EST LOW 20 MIN: CPT | Performed by: DERMATOLOGY

## 2019-05-09 NOTE — PROGRESS NOTES
[facility]) Dermatology  [facility], [address] [place] [address], [place] [address]       Robb Rivas  1940    78 y.o. male     Date of Visit: 2019    Chief Complaint:   Chief Complaint   Patient presents with    Follow-up     multiple AK spots        I was asked to see this patient by Dr. Payton ref. provider found. History of Present Illness:  Robb Rivas is a 78 y.o. male who presents with the chief complaint of the followin. Total body skin cancer screening exam. Hx of multiple NMSCs, denies recurrence. 2. Many year history of multiple nevi on the trunk and extremities, all present for many years. Denies new moles. Denies moles changing in size, shape, color. None associated w/ pain, bleeding, pruritus. 3.  Has multiple thick and irritated bumps to his arms, scalp, face. History of multiple actinic keratoses to sun exposed areas. Patient tolerated cryotherapy wound the past and is unsure if any have recurred. He continues to want to avoid field therapy options including PDT phototherapy and topical Efudex  4. Progressive freckling and lentigines located to sun exposed areas over several years,  Admits to sun exposure in youth without wearing sunscreen, hats, or protective clothing.  Works outdoors often, Denies regularly wearing sunscreen or protective hats/clothing when outdoors currently. Patient was advised to use topical Efudex in 2017 by [facility] dermatology department as well as to schedule for PDT phototherapy. Issa Whitten declined at that time and wanted to switch doctors. Review of Systems:  Constitutional: Reports general sense of well-being   Skin: No new or changing moles, no history of keloids or hypertrophic scars. Heme: No abnormal bruising or bleeding. Past Medical History, Family History, Surgical History, Medications and Allergies reviewed.     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 PFHx: Denies hx of MM or NMSC    History reviewed. No pertinent family history.   Past Medical History:   Diagnosis Date    BCC (basal cell carcinoma of skin)     SCC (squamous cell carcinoma)      Past Surgical History:   Procedure Laterality Date    MOHS SURGERY         Allergies   Allergen Reactions    Morphine      Outpatient Medications Marked as Taking for the 5/9/19 encounter (Office Visit) with Keesha Espino, DO   Medication Sig Dispense Refill    gabapentin (NEURONTIN) 300 MG capsule       ibuprofen (ADVIL;MOTRIN) 200 MG tablet Take 200 mg by mouth      aspirin 81 MG tablet Take 81 mg by mouth      metFORMIN (GLUCOPHAGE-XR) 500 MG extended release tablet       quinapril-hydrochlorothiazide (ACCURETIC) 20-12.5 MG per tablet          Social History:   Social History     Socioeconomic History    Marital status:      Spouse name: Not on file    Number of children: Not on file    Years of education: Not on file    Highest education level: Not on file   Occupational History    Not on file   Social Needs    Financial resource strain: Not on file    Food insecurity:     Worry: Not on file     Inability: Not on file    Transportation needs:     Medical: Not on file     Non-medical: Not on file   Tobacco Use    Smoking status: Never Smoker    Smokeless tobacco: Never Used Substance and Sexual Activity    Alcohol use: Not on file    Drug use: Not on file    Sexual activity: Not on file   Lifestyle    Physical activity:     Days per week: Not on file     Minutes per session: Not on file    Stress: Not on file   Relationships    Social connections:     Talks on phone: Not on file     Gets together: Not on file     Attends Evangelical service: Not on file     Active member of club or organization: Not on file     Attends meetings of clubs or organizations: Not on file     Relationship status: Not on file    Intimate partner violence:     Fear of current or ex partner: Not on file     Emotionally abused: Not on file     Physically abused: Not on file     Forced sexual activity: Not on file   Other Topics Concern    Not on file   Social History Narrative    Not on file       Physical Examination     The following were examined and determined to be normal: Psych/Neuro, Conjunctivae/eyelids, Gums/teeth/lips, Abdomen, RLE and Nails/digits. Groin/buttocks. Areas covered by underwear garment(s) not examined. The following were examined and determined to be abnormal: Scalp/hair, Head/face, Neck, Breast/axilla/chest and LLE and back    -General: NAD, well-nourished, well-developed.   Total body skin exam performed, areas examined listed above:   1. ill defined irreg shaped gritty keratotic pink macule(s) and papules located to vertex scalp (7), nasal bridge (2), left temple, bilateral cheeks (3), helix of right ear (2), right superior chest, left dorsal forearm (3), left dorsal hand (2), right dorsal forearm (4), right superior back, posterior neck (3)  2a) right frontal scalp-2.0 x 1.0 cm erythematous hyperkeratotic irregularly bordered plaque      2b) left pretibial lower extremity-0.7 x 0.6 cm erythematous scaly papule with central erosion      2c) right upper arm-1.1 x 0.8 cm erythematous scaly plaque      2d) right superior medial chest-1.5 x 1.0 cm ill defined pink pearly plaque      3. Scattered on the head,neck, trunk and extremities are multiple well-defined round and oval symmetric smoothly-bordered uniformly brown macules and papules. no change in size/shape/color of any lesions; no bleeding lesions. 4. Face, neck, chest, back, scalp, upper extremities-Scattered dyspigmentation with multiple lentigines and vascular change consistent with chronic sun exposure  5. left malar cheek, left proximal posterior upper arm, right neck, left inferior lateral cheek, left malar cheek, vertex scalp, left dorsal hand, left nasal ala, left and right lateral cheeks- well healed scar at site of prior nonmelanoma skin cancer, no evidence of recurrence    Assessment and Plan     1. Multiple actinic keratoses    2. Neoplasm of uncertain behavior    3. Multiple benign nevi    4. Diffuse photoaging of skin    5. History of nonmelanoma skin cancer        1. Multiple actinic keratoses  -Edu re: relationship with chronic cumulative sun exposure, low premalignant potential.   -vertex scalp (7), nasal bridge (2), left temple, bilateral cheeks (3), helix of right ear (2), right superior chest, left dorsal forearm (3), left dorsal hand (2), right dorsal forearm (4), right superior back, posterior neck,  28 lesion(s) treated w/ liquid nitrogen x 1cycles, 3 seconds each located    Edu re: risk of blister formation, discomfort, scar, hypopigmentation. Discussed wound care. -Reviewed sun protective behavior -- sun avoidance during the peak hours of the day, sun-protective clothing (including hat and sunglasses), sunscreen use (water resistant, broad spectrum, SPF at least 30, need for reapplication every 2 to 3 hours). -Patient to contact office if AK fails to resolve despite treatment, or if patient develops side effect from therapy, such as unbearable crusting, scabbing, redness, or tenderness.    - [place] DESTRUC PREMALIGNANT,15+ LESIONS    Pt continues to decline field therapy options    RTC 3 months    2. Neoplasm of uncertain behavior  DDx:  2a) HAK rule out NMSC  2b) rule out NMSC  2c) rule out NMSC  2d) rule out [address]    -Discussed possible diagnosis. Patient agreeable to biopsy. Verbal consent obtained after risks (infection, bleeding, scar), benefits and alternatives explained. -Area(s) to be biopsied were marked with a surgical pen. Site(s) were cleansed with alcohol. Local anesthesia achieved with 1% lidocaine with epinephrine/sodium bicarbonate. Shave biopsy performed with a razor blade. Hemostasis was achieved with aluminum chloride and electrodessication. The wound(s) were dressed with petrolatum and covered with a bandage. Specimen(s) sent to pathology. Pt educated re: risk of bleeding, infection, scar, discoloration, and wound care instructions. - 43083-BL TANGENTIAL BIOPSY SKIN SINGLE LESION  - 05276-FD TANGENTIAL BIOPSY SKIN EA SEP/ADDITIONAL LESION    3. Multiple benign nevi  Benign acquired melanocytic nevi  -Recommend monthly self skin exams   -Educated regarding the ABCDEs of melanoma detection   -Call for any new/changing moles or concerning lesions  -Reviewed sun protective behavior -- sun avoidance during the peak hours of the day, sun-protective clothing (including hat and sunglasses), sunscreen use (water resistant, broad spectrum, SPF at least 30, need for reapplication every 2 to 3 hours), avoidance of tanning beds   -Plan: Observation with annual skin checks (earlier if indicated) performed in office to monitor current nevi and to assess for new lesions.     4. Diffuse photoaging of skin  -Patient's skin showing evidence of chronic sun exposure leading to diffuse photodamage and photo-aging  -Educated patient that chronic sun exposure leads to increased risk for skin cancers and to have at least annual skin exams (earlier if indicated) in the office.   -The patient was counseled regarding sun protective practices such as sunscreen use (water resistant, broad spectrum sunscreen with SPF

## 2019-05-13 LAB — DERMATOLOGY PATHOLOGY REPORT: ABNORMAL

## 2019-05-14 ENCOUNTER — TELEPHONE (OUTPATIENT)
Dept: DERMATOLOGY | Age: 79
End: 2019-05-14

## 2019-05-14 NOTE — TELEPHONE ENCOUNTER
----- Message from Zander Manning DO sent at 5/14/2019 11:30 AM EDT -----  A) thick precancer lesion  B) pre-cancer lesion  C) superficial BCC  D) BCC    Please inform of patient results and schedule a 45 minute procedural appointment for surgical excision (D) and ED&C (C) and recheck areas of actinic keratoses (A/B)  Please ask pre-op questions

## 2019-05-15 NOTE — TELEPHONE ENCOUNTER
Patient returned your call today and is requesting a call back before 10:00 a.m. On 5-16-19. He can be reached at [phone].   Thanks

## 2019-05-16 NOTE — TELEPHONE ENCOUNTER
Called and spoke to pt's wife, Sybil Rodriguez, however she didn't want me to tell her the bx results, she said pt would want to hear them first. Pt was unavailable, due to being on the lawnmower. I radha Choi I would call back again in  The morning.

## 2019-05-16 NOTE — TELEPHONE ENCOUNTER
----- Message from Mylene Carty DO sent at 5/14/2019 11:30 AM EDT -----  A) thick precancer lesion  B) pre-cancer lesion  C) superficial BCC  D) BCC    Please inform of patient results and schedule a 45 minute procedural appointment for surgical excision (D) and ED&C (C) and recheck areas of actinic keratoses (A/B)  Please ask pre-op questions

## 2019-06-27 ENCOUNTER — PROCEDURE VISIT (OUTPATIENT)
Dept: DERMATOLOGY | Age: 79
End: 2019-06-27
Payer: MEDICARE

## 2019-06-27 VITALS — DIASTOLIC BLOOD PRESSURE: 68 MMHG | SYSTOLIC BLOOD PRESSURE: 116 MMHG | WEIGHT: 200.8 LBS

## 2019-06-27 DIAGNOSIS — C44.91 SUPERFICIAL BASAL CELL CARCINOMA: ICD-10-CM

## 2019-06-27 DIAGNOSIS — C44.91 NODULAR BASAL CELL CARCINOMA (BCC): Primary | ICD-10-CM

## 2019-06-27 DIAGNOSIS — L57.0 ACTINIC KERATOSES: ICD-10-CM

## 2019-06-27 PROCEDURE — 12032 INTMD RPR S/A/T/EXT 2.6-7.5: CPT | Performed by: DERMATOLOGY

## 2019-06-27 PROCEDURE — 17003 DESTRUCT PREMALG LES 2-14: CPT | Performed by: DERMATOLOGY

## 2019-06-27 PROCEDURE — 11603 EXC TR-EXT MAL+MARG 2.1-3 CM: CPT | Performed by: DERMATOLOGY

## 2019-06-27 PROCEDURE — 17262 DSTRJ MAL LES T/A/L 1.1-2.0: CPT | Performed by: DERMATOLOGY

## 2019-06-27 PROCEDURE — 17000 DESTRUCT PREMALG LESION: CPT | Performed by: DERMATOLOGY

## 2019-06-27 NOTE — PROGRESS NOTES
[facility]) Dermatology  [place], [place], [address]     Poonam Chen  1940    78 y.o. male     Date of Visit: 2019    Chief Complaint:   Chief Complaint   Patient presents with    Procedure     excision on chest, ED&C of upper arm        History of Present Illness:  Poonam Chen is a 78 y.o. male who presents with the chief complaint of follow up for the followin. Biopsy proven nodular BCC located on right superior medial chest, presents today for surgical excision. No concerns since biopsy. Pacemaker/ICD: No  Joint prosthesis: No  Difficulty with numbing in the past: No  Local Anesthesia Reaction: No  Artificial Heart Valve: No  Organ Transplant: No  Immunosuppression: No  Bleeding/Clotting Disorders: No  Anticoagulant Therapy: Yes, ASA 81mg    2. Biopsy-proven superficial basal cell carcinoma to right upper arm, presents today for electrodesiccation and curettage. No concern since biopsy. 3.  Biopsy-proven hypertrophic actinic keratosis located to right frontal scalp and biopsy-proven actinic keratosis located to left pretibial lower extremity, unsure if lesions remain after biopsies. Review of Systems:  Constitutional: Reports general sense of well-being   Skin: No new or changing moles, no history of keloids or hypertrophic scars. Heme: No abnormal bruising or bleeding. Past Medical History, Family History, Surgical History, Medications and Allergies reviewed. History reviewed. No pertinent family history.   Past Medical History:   Diagnosis Date    BCC (basal cell carcinoma of skin)     SCC (squamous cell carcinoma)      Past Surgical History:   Procedure Laterality Date    MOHS SURGERY         Allergies   Allergen Reactions    Morphine      Outpatient Medications Marked as Taking for the 19 encounter (Procedure visit) with Ras [place], DO   Medication Sig Dispense Refill    gabapentin (NEURONTIN) 300 MG capsule       ibuprofen (ADVIL;MOTRIN) 200 MG tablet Take 200 mg by mouth      aspirin 81 MG tablet Take 81 mg by mouth      metFORMIN (GLUCOPHAGE-XR) 500 MG extended release tablet       quinapril-hydrochlorothiazide (ACCURETIC) 20-12.5 MG per tablet          Social History:   Social History     Socioeconomic History    Marital status:      Spouse name: Not on file    Number of children: Not on file    Years of education: Not on file    Highest education level: Not on file   Occupational History    Not on file   Social Needs    Financial resource strain: Not on file    Food insecurity:     Worry: Not on file     Inability: Not on file    Transportation needs:     Medical: Not on file     Non-medical: Not on file   Tobacco Use    Smoking status: Never Smoker    Smokeless tobacco: Never Used   Substance and Sexual Activity    Alcohol use: Not on file    Drug use: Not on file    Sexual activity: Not on file   Lifestyle    Physical activity:     Days per week: Not on file     Minutes per session: Not on file    Stress: Not on file   Relationships    Social connections:     Talks on phone: Not on file     Gets together: Not on file     Attends [employer] service: Not on file     Active member of club or organization: Not on file     Attends meetings of clubs or organizations: Not on file     Relationship status: Not on file    Intimate partner violence:     Fear of current or ex partner: Not on file     Emotionally abused: Not on file     Physically abused: Not on file     Forced sexual activity: Not on file   Other Topics Concern    Not on file   Social History Narrative    Not on file       Physical Examination     Well appearing. A+Ox3. No acute distress. BP: 116/68  1. Right superior medial chest- 1.5cm pearly whitish pink papule at site of biopsied BCC      2. Right upper arm- 1.1cm erythematous pink scaly papule at site of biopsied superficial BCC      3.   Right frontal scalp (3) and left pretibial lower extremity and right superior chest- ill defined irreg shaped gritty keratotic pink macule(s)     Assessment and Plan     1. Nodular basal cell carcinoma (BCC)    2. Superficial basal cell carcinoma    3. Actinic keratoses        1. Nodular basal cell carcinoma (BCC)  -Informed written consent obtained after risks (bleeding, infection, scar, discomfort) and benefits explained. -Area prepped/draped in sterile fashion. Local anesthesia achieved with 1% lidocaine w/ epinephrine/sodium bicarbonate. Elliptical excision to superficial subcutaneous fat performed. Specimen sent to pathology. Edges undermined and hemostasis achieved w/ pinpoint electrodessication. Layered closure with 3-0 deep vicryl sutures and 5-0 running nylon sutures. Pressure dressing applied.   -Width of lesion w/ 3-4 mm margins - 2.1 cm; length of repair 6.3 cm.   -Edu re: wound care, suture removal   - Pt left office in stable condition. F/u 6 mo for FSE, sooner prn.    - MT EXC SKIN MALIG 1.1-2CM TRUNK,ARM,LEG  - MT LAYR CLOS WND TRUNK,ARM,LEG 2.6-7.5 CM    2. Superficial basal cell carcinoma  Curettage performed after informed written consent obtained following explanation of risks, benefits, alternatives  -Area cleansed with chlorhexidine. Local anesthesia acheived with 1% lidocaine with epinephrine/sodium bicarbonate. Sharp curettage performed in 3 different directions with 3-4mm margins. First pass size 1.7 cm. Hemostasis obtained with aluminum chloride and electrodessication. Wound dressed with pressure dressing.   -Edu re: bleeding, discomfort, infection, scar  -Edu re: wound care, risk of recurrence  - MT DESTR MALIG TRUNK,EXTREM 1.1-2 CM    3.  Actinic keratoses  -Edu re: relationship with chronic cumulative sun exposure, low premalignant potential.   -ight frontal scalp (3) and left pretibial lower extremity and right superior chest, 5 lesion(s) treated w/ liquid nitrogen x 1cycles, 3 seconds each located    Edu re: risk of blister formation, discomfort, scar, hypopigmentation. Discussed wound care. -Reviewed sun protective behavior -- sun avoidance during the peak hours of the day, sun-protective clothing (including hat and sunglasses), sunscreen use (water resistant, broad spectrum, SPF at least 30, need for reapplication every 2 to 3 hours).    -Patient to contact office if AK fails to resolve despite treatment, or if patient develops side effect from therapy, such as unbearable crusting, scabbing, redness, or tenderness.    - [place] DESTRUC PREMALIGNANT, FIRST LESION  - IA DESTRUC PREMALIGNANT,2-14 LESIONS        Return in about 2 weeks (around 7/11/2019) for suture removal.

## 2019-07-02 ENCOUNTER — TELEPHONE (OUTPATIENT)
Dept: DERMATOLOGY | Age: 79
End: 2019-07-02

## 2019-07-03 LAB — DERMATOLOGY PATHOLOGY REPORT: ABNORMAL

## 2019-07-08 ENCOUNTER — TELEPHONE (OUTPATIENT)
Dept: DERMATOLOGY | Age: 79
End: 2019-07-08

## 2019-07-11 ENCOUNTER — NURSE ONLY (OUTPATIENT)
Dept: DERMATOLOGY | Age: 79
End: 2019-07-11

## 2019-07-11 DIAGNOSIS — C44.519 BASAL CELL CARCINOMA (BCC) OF SKIN OF OTHER PART OF TORSO: Primary | ICD-10-CM

## 2019-07-11 PROCEDURE — 99024 POSTOP FOLLOW-UP VISIT: CPT | Performed by: DERMATOLOGY

## 2019-08-15 ENCOUNTER — OFFICE VISIT (OUTPATIENT)
Dept: DERMATOLOGY | Age: 79
End: 2019-08-15
Payer: MEDICARE

## 2019-08-15 DIAGNOSIS — L57.0 MULTIPLE ACTINIC KERATOSES: Primary | ICD-10-CM

## 2019-08-15 DIAGNOSIS — Z85.828 HISTORY OF NONMELANOMA SKIN CANCER: ICD-10-CM

## 2019-08-15 DIAGNOSIS — L81.4 LENTIGINES: ICD-10-CM

## 2019-08-15 DIAGNOSIS — D48.9 NEOPLASM OF UNCERTAIN BEHAVIOR: ICD-10-CM

## 2019-08-15 PROCEDURE — 1123F ACP DISCUSS/DSCN MKR DOCD: CPT | Performed by: DERMATOLOGY

## 2019-08-15 PROCEDURE — 1036F TOBACCO NON-USER: CPT | Performed by: DERMATOLOGY

## 2019-08-15 PROCEDURE — 17004 DESTROY PREMAL LESIONS 15/>: CPT | Performed by: DERMATOLOGY

## 2019-08-15 PROCEDURE — 4040F PNEUMOC VAC/ADMIN/RCVD: CPT | Performed by: DERMATOLOGY

## 2019-08-15 PROCEDURE — 11102 TANGNTL BX SKIN SINGLE LES: CPT | Performed by: DERMATOLOGY

## 2019-08-15 PROCEDURE — G8421 BMI NOT CALCULATED: HCPCS | Performed by: DERMATOLOGY

## 2019-08-15 PROCEDURE — 99213 OFFICE O/P EST LOW 20 MIN: CPT | Performed by: DERMATOLOGY

## 2019-08-15 PROCEDURE — G8427 DOCREV CUR MEDS BY ELIG CLIN: HCPCS | Performed by: DERMATOLOGY

## 2019-08-15 PROCEDURE — 11103 TANGNTL BX SKIN EA SEP/ADDL: CPT | Performed by: DERMATOLOGY

## 2019-08-15 NOTE — PROGRESS NOTES
BCC  -6/2019-right upper arm status post electrodesiccation and curettage of superficial BCC  -11/19/18-moderately differentiated SCC to left malar cheek treated with Mohs surgery by Dr. 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 PFHx: Denies hx of MM or NMSC    History reviewed. No pertinent family history.   Past Medical History:   Diagnosis Date    BCC (basal cell carcinoma of skin)     SCC (squamous cell carcinoma)      Past Surgical History:   Procedure Laterality Date    MOHS SURGERY         Allergies   Allergen Reactions    Morphine      Outpatient Medications Marked as Taking for the 8/15/19 encounter (Office Visit) with Clarence Martel,    Medication Sig Dispense Refill    gabapentin (NEURONTIN) 300 MG capsule       ibuprofen (ADVIL;MOTRIN) 200 MG tablet Take 200 mg by mouth      aspirin 81 MG tablet Take 81 mg by mouth      metFORMIN (GLUCOPHAGE-XR) 500 MG extended release tablet       quinapril-hydrochlorothiazide (ACCURETIC) 20-12.5 MG per tablet          Social History:   Social History     Socioeconomic History    Marital status:      Spouse name: Not on file    Number of children: Not on file    Years of

## 2019-08-15 NOTE — PATIENT INSTRUCTIONS
Sun Protection Tips    · Apply broad spectrum water resistant sunscreen with an SPF of at least 30 to exposed areas of the skin. Dont forget the ears and lips! Remember to reapply sunscreen about every 2 hours and after swimming or sweating. · Wear sun protective clothing. Swim shirts (aka. rash guards) are a great idea and negates the need to reapply sunscreen in those areas. · Seek the shade whenever possible especially between the hours of 10am and 4pm when the suns rays are the strongest.     · Avoid tanning beds    · Wear a wide brim hat while in the sun  Cryosurgery (Freezing) Wound Care Instructions    AFTER THE PROCEDURE:    You will notice swelling and redness around the site. This is normal.    You may experience a sharp or sore feeling for the next several days. For this discomfort, you may take acetaminophen (Tylenol©).  A blister may develop at the treated area, sometimes as soon as by the end of the day. After several days, the blister will subside and a scab will form.  If the area is bumped or traumatized during the first few days following freezing, you may develop bleeding into the blister, forming a blood blister. This is nothing to be alarmed about.  If the blister is tense, uncomfortable, or much larger than the site that was frozen, you may pop the blister along its edge with a sterile needle (boiled, heated under a flame, or cleaned with alcohol) to allow the fluid to drain out. If the blister does not bother you, no treatment is needed.  Do NOT peel off the top of the blister roof. It will act as a dressing on top of your wound. WOUND CARE:    You may shower or bathe as usual, but avoid scrubbing the areas that have been frozen.  Cleanse the site twice a day with mild soapy water, and then apply a thin film of white petrolatum (Vaseline©).  You do not need to cover the area, but can if you prefer.     Do NOT allow the site to become dry or crusted, or attempt

## 2019-08-19 ENCOUNTER — TELEPHONE (OUTPATIENT)
Dept: DERMATOLOGY | Age: 79
End: 2019-08-19

## 2019-08-19 LAB — DERMATOLOGY PATHOLOGY REPORT: ABNORMAL

## 2019-08-19 NOTE — TELEPHONE ENCOUNTER
----- Message from Pepe Hernandez DO sent at 8/19/2019 11:37 AM EDT -----  A) superficial BCC, schedule ED&C for 4/22 at 4:30pm (keep 30 min)  B) benign irritated keratosis, no further treatment needed.  Please notify pt of result(s)

## 2019-08-22 ENCOUNTER — PROCEDURE VISIT (OUTPATIENT)
Dept: DERMATOLOGY | Age: 79
End: 2019-08-22
Payer: MEDICARE

## 2019-08-22 DIAGNOSIS — C44.91 SUPERFICIAL BASAL CELL CARCINOMA: Primary | ICD-10-CM

## 2019-08-22 PROCEDURE — 17262 DSTRJ MAL LES T/A/L 1.1-2.0: CPT | Performed by: DERMATOLOGY

## 2019-08-22 NOTE — PROGRESS NOTES
Spouse name: Not on file    Number of children: Not on file    Years of education: Not on file    Highest education level: Not on file   Occupational History    Not on file   Social Needs    Financial resource strain: Not on file    Food insecurity:     Worry: Not on file     Inability: Not on file    Transportation needs:     Medical: Not on file     Non-medical: Not on file   Tobacco Use    Smoking status: Never Smoker    Smokeless tobacco: Never Used   Substance and Sexual Activity    Alcohol use: Not on file    Drug use: Not on file    Sexual activity: Not on file   Lifestyle    Physical activity:     Days per week: Not on file     Minutes per session: Not on file    Stress: Not on file   Relationships    Social connections:     Talks on phone: Not on file     Gets together: Not on file     Attends Hinduism service: Not on file     Active member of club or organization: Not on file     Attends meetings of clubs or organizations: Not on file     Relationship status: Not on file    Intimate partner violence:     Fear of current or ex partner: Not on file     Emotionally abused: Not on file     Physically abused: Not on file     Forced sexual activity: Not on file   Other Topics Concern    Not on file   Social History Narrative    Not on file       Physical Examination     Well appearing. 1. Left superior chest-0.9cm pink macule with hemorrhagic crust    Assessment and Plan     1. Superficial basal cell carcinoma        1. Superficial basal cell carcinoma  Curettage performed after informed written consent obtained following explanation of risks, benefits, alternatives  -Area cleansed with chlorhexidine. Local anesthesia acheived with 1% lidocaine with epinephrine/sodium bicarbonate. Sharp curettage performed in 3 different directions with 3-4mm margins. First pass size 1.5 cm. Hemostasis obtained with aluminum chloride and electrodessication. Wound dressed with pressure dressing.

## 2019-11-14 ENCOUNTER — OFFICE VISIT (OUTPATIENT)
Dept: DERMATOLOGY | Age: 79
End: 2019-11-14
Payer: MEDICARE

## 2019-11-14 DIAGNOSIS — T14.90XD HEALING WOUND: ICD-10-CM

## 2019-11-14 DIAGNOSIS — D48.9 NEOPLASM OF UNCERTAIN BEHAVIOR: ICD-10-CM

## 2019-11-14 DIAGNOSIS — Z85.828 HISTORY OF NONMELANOMA SKIN CANCER: ICD-10-CM

## 2019-11-14 DIAGNOSIS — L57.0 MULTIPLE ACTINIC KERATOSES: Primary | ICD-10-CM

## 2019-11-14 PROCEDURE — 11102 TANGNTL BX SKIN SINGLE LES: CPT | Performed by: DERMATOLOGY

## 2019-11-14 PROCEDURE — 1123F ACP DISCUSS/DSCN MKR DOCD: CPT | Performed by: DERMATOLOGY

## 2019-11-14 PROCEDURE — 1036F TOBACCO NON-USER: CPT | Performed by: DERMATOLOGY

## 2019-11-14 PROCEDURE — G8484 FLU IMMUNIZE NO ADMIN: HCPCS | Performed by: DERMATOLOGY

## 2019-11-14 PROCEDURE — 4040F PNEUMOC VAC/ADMIN/RCVD: CPT | Performed by: DERMATOLOGY

## 2019-11-14 PROCEDURE — G8421 BMI NOT CALCULATED: HCPCS | Performed by: DERMATOLOGY

## 2019-11-14 PROCEDURE — 17004 DESTROY PREMAL LESIONS 15/>: CPT | Performed by: DERMATOLOGY

## 2019-11-14 PROCEDURE — 99213 OFFICE O/P EST LOW 20 MIN: CPT | Performed by: DERMATOLOGY

## 2019-11-14 PROCEDURE — 11103 TANGNTL BX SKIN EA SEP/ADDL: CPT | Performed by: DERMATOLOGY

## 2019-11-14 PROCEDURE — G8427 DOCREV CUR MEDS BY ELIG CLIN: HCPCS | Performed by: DERMATOLOGY

## 2019-11-18 LAB — DERMATOLOGY PATHOLOGY REPORT: NORMAL

## 2019-11-19 ENCOUNTER — TELEPHONE (OUTPATIENT)
Dept: DERMATOLOGY | Age: 79
End: 2019-11-19

## 2019-12-19 ENCOUNTER — OFFICE VISIT (OUTPATIENT)
Dept: DERMATOLOGY | Age: 79
End: 2019-12-19
Payer: MEDICARE

## 2019-12-19 DIAGNOSIS — L57.0 MULTIPLE ACTINIC KERATOSES: Primary | ICD-10-CM

## 2019-12-19 PROCEDURE — 17004 DESTROY PREMAL LESIONS 15/>: CPT | Performed by: DERMATOLOGY

## 2020-02-20 ENCOUNTER — OFFICE VISIT (OUTPATIENT)
Dept: DERMATOLOGY | Age: 80
End: 2020-02-20
Payer: MEDICARE

## 2020-02-20 PROCEDURE — G8484 FLU IMMUNIZE NO ADMIN: HCPCS | Performed by: DERMATOLOGY

## 2020-02-20 PROCEDURE — G8421 BMI NOT CALCULATED: HCPCS | Performed by: DERMATOLOGY

## 2020-02-20 PROCEDURE — 1123F ACP DISCUSS/DSCN MKR DOCD: CPT | Performed by: DERMATOLOGY

## 2020-02-20 PROCEDURE — G8427 DOCREV CUR MEDS BY ELIG CLIN: HCPCS | Performed by: DERMATOLOGY

## 2020-02-20 PROCEDURE — 11102 TANGNTL BX SKIN SINGLE LES: CPT | Performed by: DERMATOLOGY

## 2020-02-20 PROCEDURE — 11103 TANGNTL BX SKIN EA SEP/ADDL: CPT | Performed by: DERMATOLOGY

## 2020-02-20 PROCEDURE — 4040F PNEUMOC VAC/ADMIN/RCVD: CPT | Performed by: DERMATOLOGY

## 2020-02-20 PROCEDURE — 99213 OFFICE O/P EST LOW 20 MIN: CPT | Performed by: DERMATOLOGY

## 2020-02-20 PROCEDURE — 17004 DESTROY PREMAL LESIONS 15/>: CPT | Performed by: DERMATOLOGY

## 2020-02-20 PROCEDURE — 1036F TOBACCO NON-USER: CPT | Performed by: DERMATOLOGY

## 2020-02-20 NOTE — PATIENT INSTRUCTIONS
Sun Protection Tips    Apply broad spectrum water resistant sunscreen with an SPF of at least 30 to exposed areas of the skin. Dont forget the ears and lips! Remember to reapply sunscreen about every 2 hours and after swimming or sweating. Wear sun protective clothing. Swim shirts (aka. rash guards) are a great idea and negates the need to reapply sunscreen in those areas. Seek the shade whenever possible especially between the hours of 10am and 4pm when the suns rays are the strongest.     Avoid tanning beds          Wear a wide brim hat while in the sun        Cryosurgery (Freezing) Wound Care Instructions    AFTER THE PROCEDURE:    You will notice swelling and redness around the site. This is normal.    You may experience a sharp or sore feeling for the next several days. For this discomfort, you may take acetaminophen (Tylenol©).  A blister may develop at the treated area, sometimes as soon as by the end of the day. After several days, the blister will subside and a scab will form.  If the area is bumped or traumatized during the first few days following freezing, you may develop bleeding into the blister, forming a blood blister. This is nothing to be alarmed about.  If the blister is tense, uncomfortable, or much larger than the site that was frozen, you may pop the blister along its edge with a sterile needle (boiled, heated under a flame, or cleaned with alcohol) to allow the fluid to drain out. If the blister does not bother you, no treatment is needed.  Do NOT peel off the top of the blister roof. It will act as a dressing on top of your wound. WOUND CARE:    You may shower or bathe as usual, but avoid scrubbing the areas that have been frozen.  Cleanse the site twice a day with mild soapy water, and then apply a thin film of white petrolatum (Vaseline©).  You do not need to cover the area, but can if you prefer.     Do NOT allow the site to become dry or crusted, or attempt to dry it out with rubbing alcohol or hydrogen peroxide.  Continue this regimen until the area is pink and healed. Depending on the size and location of your cryosurgery site, healing may take 2 to 4 weeks.  The area may continue to be pink for several weeks, and over the next few months may become darker or lighter than the surrounding skin. This may be a permanent change. Biopsy Wound Care Instructions   Cleanse the wound with mild soapy water daily.  Gently dry the area.  Apply Vaseline or petroleum jelly to the wound using a cotton tipped applicator or Qtip.  Cover with a clean bandage.  Repeat this process until the biopsy site is healed.  You may shower and bathe as usual.   ** Biopsy results generally take around 7 business days to come back. If you have not heard from us by then, please call the office at [phone] between 8AM and 4PM Monday through Friday. Thanks for your visit! Feel free to call Dr. Maria Luisa Ledesma assistantNahomi if you have questions, concerns or to schedule your cosmetic procedures.

## 2020-02-20 NOTE — PROGRESS NOTES
wearing sunscreen or protective hats/clothing when outdoors currently. 4.  New raised thickened bump to his left frontal scalp, present for about 1 month. Denies any bleeding, pain, pruritus. 5.  New raised rough feeling area to his vertex scalp present for about 1 month. he denies associated burning, bleeding, pain, or itch. Review of Systems:  Constitutional: Reports general sense of well-being   Skin: No new or changing moles, no tendency to develop thick scars, no interval of severe sunburns  Heme: No abnormal bruising or bleeding. Past Medical History, Family History, Surgical History, Medications and Allergies reviewed.     Past Skin Hx:  -11/2019-HAK located to right frontal scalp and right dorsal hand- presents today for cryotherapy   -8/2019-history of superficial BCC located left superior chest status post electrodesiccation and curettage  -6/2019-right superior medial chest s/p surgical excision of nodular BCC  -6/2019-right upper arm status post electrodesiccation and curettage of superficial BCC  -11/19/18-moderately differentiated SCC to left malar cheek treated with Mohs surgery by Dr. 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     PFHx: Denies hx of MM or NMSC     PSHx: grandchildren- valedictorians, one granddaugther won award for photography    No family history on file.   Past Medical History:   Diagnosis Date    BCC (basal cell carcinoma of skin)     SCC (squamous cell carcinoma)      Past Surgical History:   Procedure Laterality Date    MOHS SURGERY         Allergies   Allergen Reactions    Morphine      Outpatient Medications Marked as Taking for the 2/20/20 encounter (Office Visit) with Garrison Evans, DO   Medication Sig Dispense Refill    gabapentin (NEURONTIN) 300 MG capsule       ibuprofen (ADVIL;MOTRIN) 200 MG tablet Take 400 mg by mouth 2 times daily       aspirin 81 MG tablet Take 81 mg by mouth      metFORMIN (GLUCOPHAGE-XR) 500 MG extended release tablet          Social History:   Social History     Socioeconomic History    Marital status:      Spouse name: Not on file    Number of children: Not on file    Years of education: Not on file    Highest education level: Not on file   Occupational History    Not on file   Social Needs    Financial resource strain: Not on file    Food insecurity:     Worry: Not on file     Inability: Not on file    Transportation needs:     Medical: Not on file     Non-medical: Not on file   Tobacco Use    Smoking status: Never Smoker    Smokeless tobacco: Never Used   Substance and Sexual Activity    Alcohol use: Not on file    Drug use: Not on file    Sexual activity: Not on file   Lifestyle    Physical activity:     Days per week: Not on file     Minutes per session: Not on file    Stress: Not on file   Relationships    Social connections:     Talks on phone: Not on file     Gets together: Not on file     Attends Sikh service: Not on file     Active member of club or organization: Not on file     Attends meetings of clubs or organizations: Not on file     Relationship status: Not on file    Intimate partner violence:     Fear of current or ex Verbal consent obtained after risks (infection, bleeding, scar, dyspigmentation), benefits and alternatives explained. -Area(s) to be biopsied were marked with a surgical pen. Site(s) were cleansed with alcohol. Local anesthesia achieved with 1% lidocaine with epinephrine/sodium bicarbonate. Shave biopsy performed with a razor blade. Hemostasis was achieved with aluminum chloride and electrodessication. The wound(s) were dressed with petrolatum and covered with a bandage. Specimen(s) sent to pathology. Pt educated re: risk of bleeding, infection, scar, discoloration, and wound care instructions. He continues to decline phototherapy or chemotherapy treatments to decrease actinic keratoses and photodamage. 2. Multiple actinic keratoses  -Edu re: relationship with chronic cumulative sun exposure, low premalignant potential.   -right dorsal forearm (4), right dorsal hand, left dorsal forearm (2), vertex scalp (2), left frontal scalp (3), forehead (6), nasal bridge, right cheek (2), posterior neck (3), 24 lesion(s) treated w/ liquid nitrogen x 1cycles, 3 seconds each located    Edu re: risk of blister formation, discomfort, scar, dyspigmentation. Discussed wound care. -Reviewed sun protective behavior -- sun avoidance during the peak hours of the day, sun-protective clothing (including hat and sunglasses), sunscreen use (water resistant, broad spectrum, SPF at least 30, need for reapplication every 2 to 3 hours). -Patient to contact office if AK fails to resolve despite treatment, or if patient develops side effect from therapy, such as unbearable crusting, scabbing, redness, or tenderness.    - ME DESTRUC PREMALIGNANT,15+ LESIONS    3.  Photoaging of skin  -Patient's skin showing evidence of chronic sun exposure leading to diffuse photodamage and photo-aging  -Educated patient that chronic sun exposure leads to increased risk for skin cancers and to have at least annual skin exams (earlier if indicated) in

## 2020-02-25 LAB — DERMATOLOGY PATHOLOGY REPORT: NORMAL

## 2020-06-16 ENCOUNTER — OFFICE VISIT (OUTPATIENT)
Dept: DERMATOLOGY | Age: 80
End: 2020-06-16
Payer: MEDICARE

## 2020-06-16 PROCEDURE — G8427 DOCREV CUR MEDS BY ELIG CLIN: HCPCS | Performed by: DERMATOLOGY

## 2020-06-16 PROCEDURE — 1123F ACP DISCUSS/DSCN MKR DOCD: CPT | Performed by: DERMATOLOGY

## 2020-06-16 PROCEDURE — 99213 OFFICE O/P EST LOW 20 MIN: CPT | Performed by: DERMATOLOGY

## 2020-06-16 PROCEDURE — G8421 BMI NOT CALCULATED: HCPCS | Performed by: DERMATOLOGY

## 2020-06-16 PROCEDURE — 11102 TANGNTL BX SKIN SINGLE LES: CPT | Performed by: DERMATOLOGY

## 2020-06-16 PROCEDURE — 17004 DESTROY PREMAL LESIONS 15/>: CPT | Performed by: DERMATOLOGY

## 2020-06-16 PROCEDURE — 1036F TOBACCO NON-USER: CPT | Performed by: DERMATOLOGY

## 2020-06-16 PROCEDURE — 4040F PNEUMOC VAC/ADMIN/RCVD: CPT | Performed by: DERMATOLOGY

## 2020-06-16 NOTE — PROGRESS NOTES
Systems:  Constitutional: Reports general sense of well-being   Skin: No new or changing moles, no tendency to develop thick scars, no interval of severe sunburns  Heme: No abnormal bruising or bleeding. Past Medical History, Family History, Surgical History, Medications and Allergies reviewed. Past Skin Hx:  -11/2019-HAK located to right frontal scalp and right dorsal hand- presents today for cryotherapy   -8/2019-history of superficial BCC located left superior chest status post electrodesiccation and curettage  -6/2019-right superior medial chest s/p surgical excision of nodular BCC  -6/2019-right upper arm status post electrodesiccation and curettage of superficial BCC  -11/19/18-moderately differentiated SCC to left malar cheek treated with Mohs surgery by Dr. 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     PFHx: Denies hx of MM or NMSC     PSHx: grandchildren- valedictorians, one granddaugther won award for [facility] [employer], grandson- severe car accident recently    History reviewed. No pertinent family history.   Past Medical History:   Diagnosis Date    BCC (basal cell carcinoma of skin)     SCC (squamous cell carcinoma)      Past Surgical History: Procedure Laterality Date    MOHS SURGERY         Allergies   Allergen Reactions    Morphine      Outpatient Medications Marked as Taking for the 6/16/20 encounter (Office Visit) with Austin Post, DO   Medication Sig Dispense Refill    gabapentin (NEURONTIN) 300 MG capsule       ibuprofen (ADVIL;MOTRIN) 200 MG tablet Take 400 mg by mouth 2 times daily       aspirin 81 MG tablet Take 81 mg by mouth      metFORMIN (GLUCOPHAGE-XR) 500 MG extended release tablet       quinapril-hydrochlorothiazide (ACCURETIC) 20-12.5 MG per tablet          Social History:   Social History     Socioeconomic History    Marital status:      Spouse name: Not on file    Number of children: Not on file    Years of education: Not on file    Highest education level: Not on file   Occupational History    Not on file   Social Needs    Financial resource strain: Not on file    Food insecurity     Worry: Not on file     Inability: Not on file    Transportation needs     Medical: Not on file     Non-medical: Not on file   Tobacco Use    Smoking status: Never Smoker    Smokeless tobacco: Never Used   Substance and Sexual Activity    Alcohol use: Not on file    Drug use: Not on file    Sexual activity: Not on file   Lifestyle    Physical activity     Days per week: Not on file     Minutes per session: Not on file    Stress: Not on file   Relationships    Social connections     Talks on phone: Not on file     Gets together: Not on file     Attends Evangelical service: Not on file     Active member of club or organization: Not on file     Attends meetings of clubs or organizations: Not on file     Relationship status: Not on file    Intimate partner violence     Fear of current or ex partner: Not on file     Emotionally abused: Not on file     Physically abused: Not on file     Forced sexual activity: Not on file   Other Topics Concern    Not on file   Social History Narrative    Not on file       Physical

## 2020-06-19 LAB — DERMATOLOGY PATHOLOGY REPORT: ABNORMAL

## 2020-07-02 ENCOUNTER — PROCEDURE VISIT (OUTPATIENT)
Dept: DERMATOLOGY | Age: 80
End: 2020-07-02
Payer: MEDICARE

## 2020-07-02 VITALS — TEMPERATURE: 99 F

## 2020-07-02 PROCEDURE — 17000 DESTRUCT PREMALG LESION: CPT | Performed by: DERMATOLOGY

## 2020-07-02 PROCEDURE — 17272 DSTR MAL LES S/N/H/F/G 1.1-2: CPT | Performed by: DERMATOLOGY

## 2020-07-02 PROCEDURE — 17003 DESTRUCT PREMALG LES 2-14: CPT | Performed by: DERMATOLOGY

## 2020-07-02 NOTE — PROGRESS NOTES
[facility]) Dermatology  Tayler Ocampo, [place], [address]     Nicolasa Munoz  1940    [de-identified] y.o. male     Date of Visit: 2020    Chief Complaint:   Chief Complaint   Patient presents with    [facility] on posterior neck        History of Present Illness:  Nicolasa Munoz is a [de-identified] y.o. male who presents with the chief complaint of follow up for the followin. Biopsy proven nodular BCC located on posterior neck, presents today for electrodessication and currettage. No concerns since biopsy. Pacemaker/ICD: No  Joint prosthesis: No  Difficulty with numbing in the past: No  Local Anesthesia Reaction: No  Artificial Heart Valve: No  Organ Transplant: No  Immunosuppression: No  Bleeding/Clotting Disorders: No  Anticoagulant Therapy: Yes, ASA 81mg      2. Complains of 2 scaly irritated spots to his tragus of right ear and right lateral forehead, noticed about 3 days ago. No prior treatment per patient. Review of Systems:  Constitutional: Reports general sense of well-being   Skin: No new or changing moles, no history of keloids or hypertrophic scars. Heme: No abnormal bruising or bleeding. Past Medical History, Family History, Surgical History, Medications and Allergies reviewed. History reviewed. No pertinent family history.   Past Medical History:   Diagnosis Date    BCC (basal cell carcinoma of skin)     SCC (squamous cell carcinoma)      Past Surgical History:   Procedure Laterality Date    MOHS SURGERY         Allergies   Allergen Reactions    Morphine      Outpatient Medications Marked as Taking for the 20 encounter (Procedure visit) with Tayler Ocampo, DO   Medication Sig Dispense Refill    gabapentin (NEURONTIN) 300 MG capsule       ibuprofen (ADVIL;MOTRIN) 200 MG tablet Take 400 mg by mouth 2 times daily       aspirin 81 MG tablet Take 81 mg by mouth      metFORMIN (GLUCOPHAGE-XR) 500 MG extended release tablet       quinapril-hydrochlorothiazide (ACCURETIC) 20-12.5 MG per tablet          Social History:   Social History     Socioeconomic History    Marital status:      Spouse name: Not on file    Number of children: Not on file    Years of education: Not on file    Highest education level: Not on file   Occupational History    Not on file   Social Needs    Financial resource strain: Not on file    Food insecurity     Worry: Not on file     Inability: Not on file    Transportation needs     Medical: Not on file     Non-medical: Not on file   Tobacco Use    Smoking status: Never Smoker    Smokeless tobacco: Never Used   Substance and Sexual Activity    Alcohol use: Not on file    Drug use: Not on file    Sexual activity: Not on file   Lifestyle    Physical activity     Days per week: Not on file     Minutes per session: Not on file    Stress: Not on file   Relationships    Social connections     Talks on phone: Not on file     Gets together: Not on file     Attends Bahai service: Not on file     Active member of club or organization: Not on file     Attends meetings of clubs or organizations: Not on file     Relationship status: Not on file    Intimate partner violence     Fear of current or ex partner: Not on file     Emotionally abused: Not on file     Physically abused: Not on file     Forced sexual activity: Not on file   Other Topics Concern    Not on file   Social History Narrative    Not on file       Physical Examination     -Constitutional: Well appearing, no acute distress  -Neurological: Alert and oriented X 3  -Mood and Affect: Pleasant  1. Posterior neck- 1.0cm scaly pink patch  2. ill defined irreg shaped gritty keratotic pink macules located to R tragus, right superior forehead, left medial forehead    Assessment and Plan     1. Nodular basal cell carcinoma        1.  Nodular basal cell carcinoma  Curettage performed after informed written consent obtained following explanation of risks (including scarring, dyspigmentation, pain, bleeding, infection, pain, etc), benefits, alternatives  -Area cleansed with chlorhexidine. Local anesthesia acheived with 1% lidocaine with epinephrine/sodium bicarbonate. Sharp curettage performed in 3 different directions with 3-4mm margins. First pass size 1.6 cm. Hemostasis obtained with aluminum chloride and electrodessication. Wound dressed with pressure dressing.   -Edu re: bleeding, discomfort, infection, scar  -Edu re: wound care, risk of recurrence    2. Actinic keratoses  -Edu re: relationship with chronic cumulative sun exposure, low premalignant potential.   Verbal consent obtained. - R tragus, right superior forehead, left medial forehead, 3 lesion(s) treated w/ liquid nitrogen x 1cycles, 3 seconds each located    Edu re: risk of blister formation, discomfort, scar, dyspigmentation. Discussed wound care. -Reviewed sun protective behavior -- sun avoidance during the peak hours of the day, sun-protective clothing (including hat and sunglasses), sunscreen use (water resistant, broad spectrum, SPF at least 30, need for reapplication every 2 to 3 hours). -Patient to contact office if AK fails to resolve despite treatment, or if patient develops side effect from therapy, such as unbearable crusting, scabbing, redness, or tenderness. Return for as scheduled .

## 2020-07-02 NOTE — PATIENT INSTRUCTIONS
Sun Protection Tips    Apply broad spectrum water resistant sunscreen with an SPF of at least 30 to exposed areas of the skin. Dont forget the ears and lips! Remember to reapply sunscreen about every 2 hours and after swimming or sweating. Wear sun protective clothing. Swim shirts (aka. rash guards) are a great idea and negates the need to reapply sunscreen in those areas. Seek the shade whenever possible especially between the hours of 10am and 4pm when the suns rays are the strongest.     Avoid tanning beds          Wear a wide brim hat while in the sun       Curettage Wound Care Instructions     Keep the bandage in place for 24 hours.  Cleanse the wound with mild soapy water daily.  Gently dry the area.  Apply Vaseline or petroleum jelly to the wound using a cotton tipped applicator.  Cover with a clean bandage.  Repeat this process until the curettage site is healed.  You may shower and bathe as usual.       Thanks for your visit! Feel free to call Dr. Lakeisha García assistantNahomi if you have questions, concerns or to schedule your cosmetic procedures.

## 2020-09-17 ENCOUNTER — OFFICE VISIT (OUTPATIENT)
Dept: DERMATOLOGY | Age: 80
End: 2020-09-17
Payer: MEDICARE

## 2020-09-17 VITALS — TEMPERATURE: 97.4 F

## 2020-09-17 PROCEDURE — 1123F ACP DISCUSS/DSCN MKR DOCD: CPT | Performed by: DERMATOLOGY

## 2020-09-17 PROCEDURE — 17004 DESTROY PREMAL LESIONS 15/>: CPT | Performed by: DERMATOLOGY

## 2020-09-17 PROCEDURE — 1036F TOBACCO NON-USER: CPT | Performed by: DERMATOLOGY

## 2020-09-17 PROCEDURE — G8421 BMI NOT CALCULATED: HCPCS | Performed by: DERMATOLOGY

## 2020-09-17 PROCEDURE — 11102 TANGNTL BX SKIN SINGLE LES: CPT | Performed by: DERMATOLOGY

## 2020-09-17 PROCEDURE — 4040F PNEUMOC VAC/ADMIN/RCVD: CPT | Performed by: DERMATOLOGY

## 2020-09-17 PROCEDURE — 11103 TANGNTL BX SKIN EA SEP/ADDL: CPT | Performed by: DERMATOLOGY

## 2020-09-17 PROCEDURE — 99213 OFFICE O/P EST LOW 20 MIN: CPT | Performed by: DERMATOLOGY

## 2020-09-17 PROCEDURE — G8427 DOCREV CUR MEDS BY ELIG CLIN: HCPCS | Performed by: DERMATOLOGY

## 2020-09-17 NOTE — PATIENT INSTRUCTIONS
Sun Protection Tips    Apply broad spectrum water resistant sunscreen with an SPF of at least 30 to exposed areas of the skin. Dont forget the ears and lips! Remember to reapply sunscreen about every 2 hours and after swimming or sweating. Wear sun protective clothing. Swim shirts (aka. rash guards) are a great idea and negates the need to reapply sunscreen in those areas. Seek the shade whenever possible especially between the hours of 10am and 4pm when the suns rays are the strongest.     Avoid tanning beds          Wear a wide brim hat while in the sun    Cryosurgery (Freezing) Wound Care Instructions    AFTER THE PROCEDURE:    You will notice swelling and redness around the site. This is normal.    You may experience a sharp or sore feeling for the next several days. For this discomfort, you may take acetaminophen (Tylenol©).  A blister may develop at the treated area, sometimes as soon as by the end of the day. After several days, the blister will subside and a scab will form.  If the area is bumped or traumatized during the first few days following freezing, you may develop bleeding into the blister, forming a blood blister. This is nothing to be alarmed about.  If the blister is tense, uncomfortable, or much larger than the site that was frozen, you may pop the blister along its edge with a sterile needle (boiled, heated under a flame, or cleaned with alcohol) to allow the fluid to drain out. If the blister does not bother you, no treatment is needed.  Do NOT peel off the top of the blister roof. It will act as a dressing on top of your wound. WOUND CARE:    You may shower or bathe as usual, but avoid scrubbing the areas that have been frozen.  Cleanse the site twice a day with mild soapy water, and then apply a thin film of white petrolatum (Vaseline©).  You do not need to cover the area, but can if you prefer.     Do NOT allow the site to become dry or crusted, or attempt to dry it out with rubbing alcohol or hydrogen peroxide.  Continue this regimen until the area is pink and healed. Depending on the size and location of your cryosurgery site, healing may take 2 to 4 weeks.  The area may continue to be pink for several weeks, and over the next few months may become darker or lighter than the surrounding skin. This may be a permanent change. Biopsy Wound Care Instructions   Cleanse the wound twice a day with mild soapy water.  Gently dry the area.  Apply Vaseline/Aquaphor to the wound using a cotton tipped applicator or Qtip.  Cover with a clean bandage.  Repeat this process until the biopsy site is healed. You will know when it's healed when it's pink and shiny.  You may shower and bathe as usual.      If bleeding should occur, apply firm pressure to bleeding area for 15 minutes and repeat if needed. If bleeding continues after 30 minutes, please call office and ask to speak to Nahomi.        ** Biopsy results generally take around 7-10  business days to come back. A member of Dr. Pressley Cowden staff will call you when the results are available. If you don't hear from our staff after the 10 business days, please call our office for your results. Thanks for your visit! Feel free to call Dr. Oriana Vieira assistant, Nahomi if you have questions, concerns or to schedule your cosmetic procedures.

## 2020-09-17 NOTE — PROGRESS NOTES
[facility]) Dermatology  [facility], [place], [address]       Christel Rabago  1940    [de-identified] y.o. male     Date of Visit: 2020    Chief Complaint:   Chief Complaint   Patient presents with    Skin Exam     moles, sun exposed exam    Other     disabled son passed away 2 weeks ago. I was asked to see this patient by Dr. Payton ref. provider found. History of Present Illness:  Christel Rabago is a [de-identified] y.o. male who presents with the chief complaint of the followin. Patient request sun exposed skin cancer screening exam only today. History of multiple nonmelanoma skin cancers status post electrodesiccation and curettage, surgical excision, Mohs surgeries.  Patient denies recurrence. 2.History of multiple actinic keratoses to sun exposed areas status post cryotherapy. Continues to experience new scaly spots and bumps scattered to his scalp, forehead, forearms over the last 3 months.   -Patient was advised to use topical Efudex in 2017 by [facility] dermatology department as well as to schedule for PDT phototherapy. Chay Flores declined at that time and wanted to switch doctors. Continues to decline field therapy options including topical Efudex and PDT.      3. History of photoaging of skin/lentigines to sun exposed areas on head/neck/upper extremities over several years.  Denies changes in size, shape, or color. Admits to sun exposure in youth without wearing sunscreen, hats, or protective clothing.  Works outdoors often, Denies regularly wearing sunscreen or protective hats/clothing when outdoors currently. 4.  Complains about a new nonhealing painful bump to his right preauricular cheek over the last 1 to 2 months. Review of Systems:  Constitutional: Reports general sense of well-being   Skin: No new or changing moles, no tendency to develop thick scars, no interval of severe sunburns  Heme: No abnormal bruising or bleeding.     Past Medical History, Family History, Surgical History, Medications and Allergies reviewed.       Past Skin Hx:  -6/2020-nodular BCC located to posterior neck-status post electrodesiccation and curettage in July 2020  -11/2019-HAK located to right frontal scalp and right dorsal hand- presents today for cryotherapy   -8/2019-history of superficial BCC located left superior chest status post electrodesiccation and curettage  -6/2019-right superior medial chest s/p surgical excision of nodular BCC  -6/2019-right upper arm status post electrodesiccation and curettage of superficial BCC  -11/19/18-moderately differentiated SCC to left malar cheek treated with Mohs surgery by Dr. 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     PFHx: Denies hx of MM or NMSC     PSHx: grandchildren- valedictorians, one granddaugther won award for [employer] [facility] [employer], grandson- severe car accident recently. Son passed away 2 weeks ago, pt coping fairly well. History reviewed. No pertinent family history.   Past Medical History:   Diagnosis Date    BCC (basal cell carcinoma of skin)     SCC (squamous cell carcinoma)      Past Surgical History:   Procedure Laterality Date    MOHS SURGERY         Allergies   Allergen Reactions    Morphine      Outpatient Medications Marked as Taking for the 9/17/20 encounter (Office Visit) with Zhanna Araujo, DO   Medication Sig Dispense Refill    gabapentin (NEURONTIN) 300 MG capsule       ibuprofen (ADVIL;MOTRIN) 200 MG tablet Take 400 mg by mouth 2 times daily       aspirin 81 MG tablet Take 81 mg by mouth      metFORMIN (GLUCOPHAGE-XR) 500 MG extended release tablet       quinapril-hydrochlorothiazide (ACCURETIC) 20-12.5 MG per tablet          Social History:   Social History     Socioeconomic History    Marital status:      Spouse name: Not on file    Number of children: Not on file    Years of education: Not on file    Highest education level: Not on file   Occupational History    Not on file   Social Needs    Financial resource strain: Not on file    Food insecurity     Worry: Not on file     Inability: Not on file    Transportation needs     Medical: Not on file     Non-medical: Not on file   Tobacco Use    Smoking status: Never Smoker    Smokeless tobacco: Never Used   Substance and Sexual Activity    Alcohol use: Not on file    Drug use: Not on file    Sexual activity: Not on file   Lifestyle    Physical activity     Days per week: Not on file     Minutes per session: Not on file    Stress: Not on file   Relationships    Social connections     Talks on phone: Not on file     Gets together: Not on file     Attends Restorationism service: Not on file     Active member of club or organization: Not on file     Attends meetings of clubs or organizations: Not on file     Relationship status: Not on file    Intimate partner violence     Fear of current or ex partner: Not on file     Emotionally abused: Not on file     Physically abused: Not on file     Forced sexual activity: Not on file   Other Topics Concern    Not on file   Social History Narrative    Not on file       Physical Examination     The following were examined and determined to be normal: hours of the day, and avoidance of tanning beds. 4. History of nonmelanoma skin cancer  No evidence of recurrence   Skin Care:  Skin cancer is primarily a result of exposure to UV light. Patients with a history of non-melanoma skin cancer should wear sunscreen, sun protective clothing, wide-brimmed hats and practice sun avoidance as much as possible to decrease their risk of developing new skin cancers. Expectations:  Scars from where skin cancers have been removed should be monitored for recurrences. Contact office:  If the patient notices discoloration, bleeding, or a bump arising in a previously healed scar or if a new lesion develops elsewhere. Return to Clinic:  3-month skin exam  Discussed plan with patient and/or primary caretaker. Patient to call clinic with any questions / concerns. Reviewed proper use and side effects of treatment(s) and/or medication(s) with patient and/or primary caretaker. AVS provided or is available on [facility]     Note is transcribed using voice recognition software. Inadvertent computerized transcription errors may be present.

## 2020-09-21 LAB — DERMATOLOGY PATHOLOGY REPORT: NORMAL

## 2020-10-05 ENCOUNTER — TELEPHONE (OUTPATIENT)
Dept: DERMATOLOGY | Age: 80
End: 2020-10-05

## 2020-10-05 NOTE — TELEPHONE ENCOUNTER
Dr Wilbur Hollis patient  Pt c/b #[phone]  Pt stated  Calling regarding biopsy results.   Please c/b to discuss

## 2020-11-12 ENCOUNTER — OFFICE VISIT (OUTPATIENT)
Dept: DERMATOLOGY | Age: 80
End: 2020-11-12
Payer: MEDICARE

## 2020-11-12 VITALS — TEMPERATURE: 96.3 F

## 2020-11-12 PROCEDURE — 69100 BIOPSY OF EXTERNAL EAR: CPT | Performed by: DERMATOLOGY

## 2020-11-12 PROCEDURE — 17004 DESTROY PREMAL LESIONS 15/>: CPT | Performed by: DERMATOLOGY

## 2020-11-12 NOTE — PROGRESS NOTES
[facility]) Dermatology  Nydia Will DO, [address]     Hailee Nixon  1940    [de-identified] y.o. male     Date of Visit: 2020    Chief Complaint:   Chief Complaint   Patient presents with    Procedure     cryo on AK's         History of Present Illness:  Hailee Nixon is a [de-identified] y.o. male who presents with the chief complaint of follow up for the followin. Biopsy proven actinic keratosis to right preauricular cheek,presents today for cryotherapy. No concerns since biopsy. 2.  Biopsy-proven hypertrophic actinic keratosis to left frontal scalp, focally transected at deep margin, presents today for cryotherapy. No concerns since biopsy. 3.  History of multiple actinic keratoses to sun exposed areas status post cryotherapy, unsure if they have recurred. Patient does feel a few raised dry spots to forehead. 4.  New complaint of a tender growth to his superior helix of right ear    Review of Systems:  Constitutional: Reports general sense of well-being   Skin: No new or changing moles, no history of keloids or hypertrophic scars. Heme: No abnormal bruising or bleeding. Past Medical History, Family History, Surgical History, Medications and Allergies reviewed. History reviewed. No pertinent family history.   Past Medical History:   Diagnosis Date    BCC (basal cell carcinoma of skin)     SCC (squamous cell carcinoma)      Past Surgical History:   Procedure Laterality Date    MOHS SURGERY         Allergies   Allergen Reactions    Morphine      Outpatient Medications Marked as Taking for the 20 encounter (Office Visit) with Nydia Will DO   Medication Sig Dispense Refill    gabapentin (NEURONTIN) 300 MG capsule       ibuprofen (ADVIL;MOTRIN) 200 MG tablet Take 400 mg by mouth 2 times daily       aspirin 81 MG tablet Take 81 mg by mouth      metFORMIN (GLUCOPHAGE-XR) 500 MG extended release tablet       quinapril-hydrochlorothiazide (ACCURETIC) 20-12.5 MG per tablet          Social History:   Social History     Socioeconomic History    Marital status:      Spouse name: Not on file    Number of children: Not on file    Years of education: Not on file    Highest education level: Not on file   Occupational History    Not on file   Social Needs    Financial resource strain: Not on file    Food insecurity     Worry: Not on file     Inability: Not on file    Transportation needs     Medical: Not on file     Non-medical: Not on file   Tobacco Use    Smoking status: Never Smoker    Smokeless tobacco: Never Used   Substance and Sexual Activity    Alcohol use: Not on file    Drug use: Not on file    Sexual activity: Not on file   Lifestyle    Physical activity     Days per week: Not on file     Minutes per session: Not on file    Stress: Not on file   Relationships    Social connections     Talks on phone: Not on file     Gets together: Not on file     Attends Baptism service: Not on file     Active member of club or organization: Not on file     Attends meetings of clubs or organizations: Not on file     Relationship status: Not on file    Intimate partner violence     Fear of current or ex partner: Not on file     Emotionally abused: Not on file     Physically abused: Not on file     Forced sexual activity: Not on file   Other Topics Concern    Not on file   Social History Narrative    Not on file       Physical Examination     The following were examined and determined to be normal: Psych/Neuro. The following were examined and determined to be abnormal: Scalp/hair and Head/face. Well appearing, A&Ox3, NAD  1. Right preauricular cheek and left frontal scalp, vertex scalp (4), forehead (8), right temple (2)- ill defined irreg shaped gritty keratotic pink macule(s) and papules  2. Superior helix of right ear-1.1x1.0cm hyperkeratotic scaly erythematous papule with telangiectasias        Assessment and Plan     1. Actinic keratoses    2.

## 2020-11-12 NOTE — PATIENT INSTRUCTIONS
to dry it out with rubbing alcohol or hydrogen peroxide.  Continue this regimen until the area is pink and healed. Depending on the size and location of your cryosurgery site, healing may take 2 to 4 weeks.  The area may continue to be pink for several weeks, and over the next few months may become darker or lighter than the surrounding skin. This may be a permanent change. Biopsy Wound Care Instructions   Cleanse the wound twice a day with mild soapy water.  Gently dry the area.  Apply Vaseline/Aquaphor to the wound using a cotton tipped applicator or Qtip.  Cover with a clean bandage.  Repeat this process until the biopsy site is healed. You will know when it's healed when it's pink and shiny.  You may shower and bathe as usual.      If bleeding should occur, apply firm pressure to bleeding area for 15 minutes and repeat if needed. If bleeding continues after 30 minutes, please call office and ask to speak to Nahomi.        ** Biopsy results generally take around 7-10  business days to come back. A member of Dr. Samson Nielsen staff will call you when the results are have been reviewed and a personalized treatment plan has been established. If you don't hear from our staff after the 10 business days, please call our office for your results. Thanks for your visit! Feel free to call Dr. Saige Smith assistant, Nahomi if you have questions, concerns or to schedule your cosmetic procedures.

## 2020-11-17 LAB — DERMATOLOGY PATHOLOGY REPORT: ABNORMAL

## 2020-11-24 ENCOUNTER — TELEPHONE (OUTPATIENT)
Dept: DERMATOLOGY | Age: 80
End: 2020-11-24

## 2020-11-24 NOTE — TELEPHONE ENCOUNTER
----- Message from Liliana Chou DO sent at 11/17/2020  3:34 PM EST -----  SCC, given histology and location-send referral to Dr. Maricruz Nelson for Mohs surgery.   Confirm patient has an appointment within the next 3 months for skin exam

## 2020-12-17 ENCOUNTER — OFFICE VISIT (OUTPATIENT)
Dept: DERMATOLOGY | Age: 80
End: 2020-12-17
Payer: MEDICARE

## 2020-12-17 VITALS — TEMPERATURE: 96.8 F

## 2020-12-17 PROCEDURE — 99213 OFFICE O/P EST LOW 20 MIN: CPT | Performed by: DERMATOLOGY

## 2020-12-17 PROCEDURE — 1036F TOBACCO NON-USER: CPT | Performed by: DERMATOLOGY

## 2020-12-17 PROCEDURE — G8421 BMI NOT CALCULATED: HCPCS | Performed by: DERMATOLOGY

## 2020-12-17 PROCEDURE — 17004 DESTROY PREMAL LESIONS 15/>: CPT | Performed by: DERMATOLOGY

## 2020-12-17 PROCEDURE — 1123F ACP DISCUSS/DSCN MKR DOCD: CPT | Performed by: DERMATOLOGY

## 2020-12-17 PROCEDURE — G8484 FLU IMMUNIZE NO ADMIN: HCPCS | Performed by: DERMATOLOGY

## 2020-12-17 PROCEDURE — 17110 DESTRUCTION B9 LES UP TO 14: CPT | Performed by: DERMATOLOGY

## 2020-12-17 PROCEDURE — 11102 TANGNTL BX SKIN SINGLE LES: CPT | Performed by: DERMATOLOGY

## 2020-12-17 PROCEDURE — 4040F PNEUMOC VAC/ADMIN/RCVD: CPT | Performed by: DERMATOLOGY

## 2020-12-17 PROCEDURE — G8427 DOCREV CUR MEDS BY ELIG CLIN: HCPCS | Performed by: DERMATOLOGY

## 2020-12-17 PROCEDURE — 11103 TANGNTL BX SKIN EA SEP/ADDL: CPT | Performed by: DERMATOLOGY

## 2020-12-17 NOTE — PROGRESS NOTES
[facility]) Dermatology  [place], [place], [address]       Shanon Beltran  1940    [de-identified] y.o. male     Date of Visit: 2020    Chief Complaint:   Chief Complaint   Patient presents with    Skin Exam     full body        I was asked to see this patient by Dr. Payton ref. provider found. History of Present Illness:  Shanon Beltran is a [de-identified] y.o. male who presents with the chief complaint of the followin. Total-body skin cancer screening exam.  History of multiple nonmelanoma skin cancers, patient denies recurrence. Most recent moderately differentiated SCC located to superior helix of right ear biopsied in 2020. Patient states he has a Mohs surgery procedure scheduled with Dr. Radha Alva in 2021.    2.  History of multiple actinic keratoses to sun exposed areas status post cryotherapy. Unsure if any have recurred. Continues to feel dry rough feeling spots to scalp, face, forearms.  -Patient was advised to use topical Efudex in 2017 by [facility] dermatology department as well as to schedule for PDT phototherapy. Marianela Clay declined at that time and wanted to switch doctors. Continues to decline field therapy options including topical Efudex and PDT.     3. Many year history of multiple nevi on the head/neck, trunk and extremities, all present for many years. Denies new moles. Denies moles changing in size, shape, color. None associated w/ pain, bleeding, pruritus. 4.  Complains of a new growth to right superior chest over the last 2 months, forms a scab repeatedly. 5.  Complains of an irritated pruritic bump located to right inferior cheek, scratches with fingernails but does not resolve. 6. Many year history of multiple nevi on the head/neck, trunk and extremities, all present for many years. Denies new moles. Denies moles changing in size, shape, color. None associated w/ pain, bleeding, pruritus.       Admits to sun exposure in youth without wearing sunscreen, hats, or protective clothing.  Works outdoors often, Denies regularly wearing sunscreen or protective hats/clothing when outdoors currently. Review of Systems:  Constitutional: Reports general sense of well-being   Skin: No new or changing moles, no tendency to develop thick scars, no interval of severe sunburns  Heme: No abnormal bruising or bleeding.       Past Skin Hx:  -11/2020-moderately differentiated SCC located to superior helix of right ear status, Mohs surgery scheduled with Dr. Leela Sams on 1/27/2021  -9/2020-actinic keratosis to right preauricular cheek status post cryotherapy  -9/2020-hypertrophic actinic keratosis focally transected at deep margin to left frontal scalp status post cryotherapy  -6/2020-nodular BCC located to posterior neck-status post electrodesiccation and curettage in July 2020  -11/2019-HAK located to right frontal scalp and right dorsal hand- presents today for cryotherapy   -8/2019-history of superficial BCC located left superior chest status post electrodesiccation and curettage  -6/2019-right superior medial chest s/p surgical excision of nodular BCC  -6/2019-right upper arm status post electrodesiccation and curettage of superficial BCC  -11/19/18-moderately differentiated SCC to left malar cheek treated with Mohs surgery by Dr. 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     PFHx: Denies hx of MM or NMSC     PSHx: grandchildren- valedictorians, one granddaugther won award for [employer], grandson- severe car accident recently. Son passed away, pt coping fairly well. ADDITIONAL HISTORY:    I have reviewed past medical and surgical histories, current medications, allergies, social and family histories as documented in the patient's electronic medical record. History reviewed. No pertinent family history.   Past Medical History:   Diagnosis Date    BCC (basal cell carcinoma of skin)     SCC (squamous cell carcinoma)      Past Surgical History:   Procedure Laterality Date    MOHS SURGERY         Allergies   Allergen Reactions    Morphine      Outpatient Medications Marked as Taking for the 12/17/20 encounter (Office Visit) with Dianna Boo, DO   Medication Sig Dispense Refill    mupirocin (BACTROBAN) 2 % ointment Apply to affected area(s) right arm BID for 2 weeks or until healed 22 g 0    gabapentin (NEURONTIN) 300 MG capsule       ibuprofen (ADVIL;MOTRIN) 200 MG tablet Take 400 mg by mouth 2 times daily       aspirin 81 MG tablet Take 81 mg by mouth      metFORMIN (GLUCOPHAGE-XR) 500 MG extended release tablet       quinapril-hydrochlorothiazide (ACCURETIC) 20-12.5 MG per tablet          Social History:   Social History     Socioeconomic History    Marital status:      Spouse name: Not on file    Number of children: Not on file    Years of education: Not on file    Highest education level: Not on file   Occupational History    Not on file   Social Needs    Financial resource strain: Not on file    Food insecurity     Worry: Not on file     Inability: Not on file    Transportation needs     Medical: Not on file     Non-medical: Not on file   Tobacco Use    Smoking potential.   Verbal consent obtained. -Frontal scalp (5), vertex scalp (3), forehead (4), superior back (3), right upper arm (2), right forearm (5), right dorsal hand (3), left dorsal forearm (4), left dorsal hand (3), right superior chest (2), antihelix of right ear, left temple (2), right lateral cheek (3), 40 lesion(s) treated w/ liquid nitrogen x 1cycles, 3 seconds each located    Edu re: risk of blister formation, discomfort, scar, dyspigmentation. Discussed wound care. -Reviewed sun protective behavior -- sun avoidance during the peak hours of the day, sun-protective clothing (including hat and sunglasses), sunscreen use (water resistant, broad spectrum, SPF at least 30, need for reapplication every 2 to 3 hours). -Patient to contact office if AK fails to resolve despite treatment or concern for recurrence (discussed signs/symptoms to monitor for) or if patient develops side effect from therapy, such as unbearable blister, crusting, scabbing, redness, or tenderness.  -pt requested to discontinue cryotherapy due to pain today, he continues to decline field therapy options and only wants to proceed with cryotherapy prn      3. Multiple benign nevi  Benign acquired melanocytic nevi  -Recommend monthly self skin exams   -Educated regarding the ABCDEs of melanoma detection   -Call for any new/changing moles or concerning lesions  -Reviewed sun protective behavior -- sun avoidance during the peak hours of the day, sun-protective clothing (including hat and sunglasses), sunscreen use (water resistant, broad spectrum, SPF at least 30, need for reapplication every 1.5 to 2 hours), avoidance of tanning beds   -Plan: Observation with annual skin checks (earlier if indicated) performed in office to monitor current nevi and to assess for new lesions.       4. Inflamed seborrheic keratosis  Patient educated that seborrheic keratoses have no malignant potential, due to localized irritation/discomfort, pt elects for cryotherapy treatment today:   -1 lesion(s) treated w/ liquid nitrogen x 1cycles - 3 seconds each. Edu re: risk of blister formation, discomfort, scar, hypopigmentation, multiple treatments may be needed, risk of recurrence. Discussed wound care and instructions given to take home.  -Advised patient to call the office if recurs or worsens despite treatment. 5. Squamous cell carcinoma of right ear  -keep Appointment scheduled for January 2021 with Dr. Esther Burger for Mohs surgery    6. History of nonmelanoma skin cancer  No evidence of recurrence   Skin Care:  Skin cancer is primarily a result of exposure to UV light. Patients with a history of non-melanoma skin cancer should wear sunscreen, sun protective clothing, wide-brimmed hats and practice sun avoidance as much as possible to decrease their risk of developing new skin cancers. Expectations:  Scars from where skin cancers have been removed should be monitored for recurrences. Contact office:  If the patient notices discoloration, bleeding, or a bump arising in a previously healed scar or if a new lesion develops elsewhere. Return to Clinic:  3-month skin exam  Discussed plan with patient and/or primary caretaker. Patient to call clinic with any questions / concerns. Reviewed proper use and side effects of treatment(s) and/or medication(s) with patient and/or primary caretaker. AVS provided or is available on [facility]     Note is transcribed using voice recognition software. Inadvertent computerized transcription errors may be present.

## 2020-12-17 NOTE — PATIENT INSTRUCTIONS
Sun Protection Tips    Apply broad spectrum water resistant sunscreen with an SPF of at least 30 to exposed areas of the skin. Dont forget the ears and lips! Remember to reapply sunscreen about every 2 hours and after swimming or sweating. Wear sun protective clothing. Swim shirts (aka. rash guards) are a great idea and negates the need to reapply sunscreen in those areas. Seek the shade whenever possible especially between the hours of 10am and 4pm when the suns rays are the strongest.     Avoid tanning beds          Wear a wide brim hat while in the sun    Cryosurgery (Freezing) Wound Care Instructions    AFTER THE PROCEDURE:    You will notice swelling and redness around the site. This is normal.    You may experience a sharp or sore feeling for the next several days. For this discomfort, you may take acetaminophen (Tylenol©).  A blister may develop at the treated area, sometimes as soon as by the end of the day. After several days, the blister will subside and a scab will form.  If the area is bumped or traumatized during the first few days following freezing, you may develop bleeding into the blister, forming a blood blister. This is nothing to be alarmed about.  If the blister is tense, uncomfortable, or much larger than the site that was frozen, you may pop the blister along its edge with a sterile needle (boiled, heated under a flame, or cleaned with alcohol) to allow the fluid to drain out. If the blister does not bother you, no treatment is needed.  Do NOT peel off the top of the blister roof. It will act as a dressing on top of your wound. WOUND CARE:    You may shower or bathe as usual, but avoid scrubbing the areas that have been frozen.  Cleanse the site twice a day with mild soapy water, and then apply a thin film of white petrolatum (Vaseline©).  You do not need to cover the area, but can if you prefer.     Do NOT allow the site to become dry or crusted, or attempt to

## 2020-12-21 LAB — DERMATOLOGY PATHOLOGY REPORT: ABNORMAL

## 2020-12-30 ENCOUNTER — TELEPHONE (OUTPATIENT)
Dept: DERMATOLOGY | Age: 80
End: 2020-12-30

## 2020-12-30 NOTE — TELEPHONE ENCOUNTER
Called pt and relayed results. Pt familiar with MOHS and referral sent. Pt scheduled for excision on 2/11/2021 at 4:00.  Letter composed and in mailbox.

## 2020-12-30 NOTE — TELEPHONE ENCOUNTER
----- Message from Dilcia Marie DO sent at 12/22/2020 10:32 AM EST -----  SERGEY nBCC  BShilo nBCC  C.  nBCC  -Schedule 30-minute surgical excision of site (A)  -Send referral to Dr. Sarthak Alcantar for Mohs surgery of sites (B) and (C)  -Confirm patient has a 3-month skin exam scheduled

## 2021-01-21 ENCOUNTER — OFFICE VISIT (OUTPATIENT)
Dept: PRIMARY CARE CLINIC | Age: 81
End: 2021-01-21
Payer: MEDICARE

## 2021-01-21 DIAGNOSIS — Z11.59 SCREENING FOR VIRAL DISEASE: Primary | ICD-10-CM

## 2021-01-21 PROCEDURE — G8421 BMI NOT CALCULATED: HCPCS | Performed by: NURSE PRACTITIONER

## 2021-01-21 PROCEDURE — G8428 CUR MEDS NOT DOCUMENT: HCPCS | Performed by: NURSE PRACTITIONER

## 2021-01-21 PROCEDURE — 99211 OFF/OP EST MAY X REQ PHY/QHP: CPT | Performed by: NURSE PRACTITIONER

## 2021-01-21 NOTE — PATIENT INSTRUCTIONS
Advance Care Planning  People with COVID-19 may have no symptoms, mild symptoms, such as fever, cough, and shortness of breath or they may have more severe illness, developing severe and fatal pneumonia. As a result, Advance Care Planning with attention to naming a health care decision maker (someone you trust to make healthcare decisions for you if you could not speak for yourself) and sharing other health care preferences is important BEFORE a possible health crisis. Please contact your Primary Care Provider to discuss Advance Care Planning. Preventing the Spread of Coronavirus Disease 2019 in Homes and Residential Communities  For the most recent information go to [email or web address]    Prevention steps for People with confirmed or suspected COVID-19 (including persons under investigation) who do not need to be hospitalized  and   People with confirmed COVID-19 who were hospitalized and determined to be medically stable to go home    Your healthcare provider and public health staff will evaluate whether you can be cared for at home. If it is determined that you do not need to be hospitalized and can be isolated at home, you will be monitored by staff from your local or state health department. You should follow the prevention steps below until a healthcare provider or local or state health department says you can return to your normal activities. Stay home except to get medical care  People who are mildly ill with COVID-19 are able to isolate at home during their illness. You should restrict activities outside your home, except for getting medical care. Do not go to work, school, or public areas. Avoid using public transportation, ride-sharing, or taxis. Separate yourself from other people and animals in your home  People: As much as possible, you should stay in a specific room and away from other people in your home.  Also, you should use a separate before eating or preparing food. If soap and water are not readily available, use an alcohol-based hand  with at least 60% alcohol, covering all surfaces of your hands and rubbing them together until they feel dry. Soap and water are the best option if hands are visibly dirty. Avoid touching your eyes, nose, and mouth with unwashed hands. Avoid sharing personal household items  You should not share dishes, drinking glasses, cups, eating utensils, towels, or bedding with other people or pets in your home. After using these items, they should be washed thoroughly with soap and water. Clean all high-touch surfaces everyday  High touch surfaces include counters, tabletops, doorknobs, bathroom fixtures, toilets, phones, keyboards, tablets, and bedside tables. Also, clean any surfaces that may have blood, stool, or body fluids on them. Use a household cleaning spray or wipe, according to the label instructions. Labels contain instructions for safe and effective use of the cleaning product including precautions you should take when applying the product, such as wearing gloves and making sure you have good ventilation during use of the product. Monitor your symptoms  Seek prompt medical attention if your illness is worsening (e.g., difficulty breathing). Before seeking care, call your healthcare provider and tell them that you have, or are being evaluated for, COVID-19. Put on a facemask before you enter the facility. These steps will help the healthcare providers office to keep other people in the office or waiting room from getting infected or exposed. Ask your healthcare provider to call the local or state health department. Persons who are placed under active monitoring or facilitated self-monitoring should follow instructions provided by their local health department or occupational health professionals, as appropriate. When working with your local health department check their available hours.   If you have a medical emergency and need to call 911, notify the dispatch personnel that you have, or are being evaluated for COVID-19. If possible, put on a facemask before emergency medical services arrive. Discontinuing home isolation  Patients with confirmed COVID-19 should remain under home isolation precautions until the risk of secondary transmission to others is thought to be low. The decision to discontinue home isolation precautions should be made on a case-by-case basis, in consultation with healthcare providers and state and local health departments.

## 2021-01-21 NOTE — PROGRESS NOTES
Serina Kee received a viral test for COVID-19. They were educated on isolation and quarantine as appropriate. For any symptoms, they were directed to seek care from their PCP, given contact information to establish with a doctor, directed to an urgent care or the emergency room.

## 2021-01-22 LAB — SARS-COV-2, NAA: NOT DETECTED

## 2021-01-26 ENCOUNTER — TELEPHONE (OUTPATIENT)
Dept: SURGERY | Age: 81
End: 2021-01-26

## 2021-01-27 ENCOUNTER — PROCEDURE VISIT (OUTPATIENT)
Dept: SURGERY | Age: 81
End: 2021-01-27
Payer: MEDICARE

## 2021-01-27 VITALS — DIASTOLIC BLOOD PRESSURE: 70 MMHG | SYSTOLIC BLOOD PRESSURE: 146 MMHG | TEMPERATURE: 97 F

## 2021-01-27 DIAGNOSIS — C44.222 SQUAMOUS CELL CARCINOMA OF RIGHT EAR: Primary | ICD-10-CM

## 2021-01-27 PROCEDURE — 17311 MOHS 1 STAGE H/N/HF/G: CPT | Performed by: DERMATOLOGY

## 2021-01-27 PROCEDURE — 17312 MOHS ADDL STAGE: CPT | Performed by: DERMATOLOGY

## 2021-01-27 PROCEDURE — 15260 FTH/GFT FR N/E/E/L 20 SQCM/<: CPT | Performed by: DERMATOLOGY

## 2021-01-27 RX ORDER — CEPHALEXIN 500 MG/1
500 CAPSULE ORAL 3 TIMES DAILY
Qty: 15 CAPSULE | Refills: 0 | Status: SHIPPED | OUTPATIENT
Start: 2021-01-27 | End: 2021-02-09 | Stop reason: ALTCHOICE

## 2021-01-27 NOTE — PROGRESS NOTES
MOHS PROCEDURE NOTE    PHYSICIAN:  Alan Xavier. Kirby Quintana MD    ASSISTANT: Wilma Steward RN     REFERRING PROVIDER:  Ko Bain. ONEL Cespedes    PREOPERATIVE DIAGNOSIS: Invasive moderately-differentiated Squamous Cell Carcinoma     SPECIFIC MOHS INDICATIONS:  size and location    AUC SCORIN/9    POSTOPERATIVE DIAGNOSIS: SAME    LOCATION: Superior helix of right ear    OPERATIVE PROCEDURE:  MOHS MICROGRAPHIC SURGERY    RECONSTRUCTION OF DEFECT: Full Thickness Skin Graft    PREOPERATIVE SIZE: 30x30 MM    DEFECT SIZE: 33x20 MM    LENGTH OF REPAIRED WOUND/SIZE OF FLAP/SIZE OF GRAFT:  15 x 8 MM (FTSG)     ANESTHESIA: 10 mL 1% lidocaine with epinephrine 1:100,000 buffered. EBL:  MINIMAL    DURATION OF PROCEDURE:  2.75 HOURS    POSTOPERATIVE OBSERVATION: 0.75 HOUR    SPECIMENS:  SEE MOHS MAP    COMPLICATIONS:  NONE    DESCRIPTION OF PROCEDURE:  The patient was given a mirror, as appropriate, and the biopsy site was identified, marked with a surgical marking pen, and verified by the patient. Options for treatment were discussed and the patient was informed that Mohs surgery was the selected treatment based on its lower recurrence rate, given the features listed above, as compared to other treatment modalities such as excision, radiation, or curettage, and agreed with this treatment plan. Risks and benefits including bruising, swelling, bleeding, infection, nerve injury, recurrence, and scarring were discussed with the patient prior to the procedure and a written consent detailing these and other risks was reviewed with the patient and signed. There was a time out for person and procedure verification. The surgical site was prepped with an antiseptic solution. Application of an antiseptic solution was repeated before each surgical stage. Stage I:  The clinically-apparent tumor was carefully defined and debulked, determining the edge of the surgical excision.     A thin layer of tumor-laden tissue was excised with a narrow margin of normal-appearing skin, using the technique of Mohs. A map was prepared to correspond to the area of skin from which it was excised. Hemostasis was achieved using electrosurgery. The wound was bandaged. The tissue was prepared for the cryostat and sectioned. 1 section(s) prepared. Each section was coded, cut, and stained for microscopic examination. The entire base and margins of the excised piece of tissue were examined by the surgeon. The tissue was examined to the level of deep sub cut fat. Stage I:  Moderately differentiated: infiltrating sheets of squamous epithelium. Structural disorganization in which squamous epithelial derivation is less obvious and less evident keratin formation limited to horn cysts in the dermis. The remaining tumor was noted and the next stage was performed. Stage II:  A thin layer of tissue was removed at the histologically-identified sites of remaining tumor. The entire procedure as described in stage I was repeated to process the tissue according to Mohs technique. 2section(s) prepared for stage II on 1 tissue block. No tumor was identified at the peripheral margins of stage II of microscopically controlled surgery. DEFECT MANAGEMENT:    REPAIR DESCRIPTION:  Various closure modalities were discussed with the patient, and it was decided that a full thickness skin graft would best preserve normal anatomic and functional relationships. Additional risks of graft necrosis and donor site scarring were discussed with the patient. The patient was placed on the procedure room table. The area was anesthetized with 1% lidocaine with epinephrine 1:100,000 buffered, was given a sterile prep using Chlorhexidine gluconate 4% solution, and draped in the usual sterile fashion. A donor site was selected from the inferior burow's triangle. It was anesthetized locally and an ellipse was harvested. Wound edges were undermined. Hemostasis was achieved with cautery. Repair was accomplished with 5-0 Vicryl buried dermal sutures and 6-0 Prolene superficial running epidermal suture. The full thickness skin graft was thinned and then draped over the defect. It was stabilized with several simple interrupted sutures of 6-0 Prolene at the superior, inferior, and lateral edges, and then excessive donor skin was trimmed. 1 5-0 Vicryl  basting stitch/stitches was placed in the center of the graft. The remainder of the graft edge was apposed to the defect edge with 6-0 Prolene running sutures         WOUND COVERAGE:  The wound was cleaned with normal saline solution, dried off, Aquaphor ointment was applied, and the wound was covered. A dressing was applied for stabilization and light pressure. The patient was given detailed oral and written instructions on postoperative care. There were no complications. The patient left the Unit in good medical condition. FOLLOW-UP: The patient will return for suture removal in 7 days. The debulk prior to MOhs was sent for permanent staining for further clarification of the tumor characteristics to better define stage. As it stands based on size this is a T2a tumor. If the tumor pathology shows poor differentiation or perineural invasion >0.1mm nerve, the tumor will be upstaged and consideration for adjuvant rads and imaging studies.

## 2021-01-27 NOTE — PROGRESS NOTES
PRE-PROCEDURE SCREENING     Pacemaker/ICD: No  Difficulty with numbing in the past: No  Local Anesthesia Reaction/passing out: No  Latex or adhesive allergy:  No  Bleeding/Clotting Disorders: No  Anticoagulant Therapy: Yes, asa 81 mg  Joint prosthesis: Yes, left knee in approx 2010  Artificial Heart Valve: No  Stroke or Seizures: No  Organ Transplant or Lymphoma: No  Immunosuppression: No  Respiratory Problems: No

## 2021-01-27 NOTE — PATIENT INSTRUCTIONS
improve. Some soreness and redness around your wound. If we worked close to your eyes (forehead, nose, temple, or upper cheeks) your eyes may become swollen and/ or black and blue. 2. Bruising, which could last 1 week or more. 3. Pink and bumpy appearance to the scar. This may happen a few weeks after your procedure. After 4 weeks, you may gently massage the area each day with facial moisturizer or petroleum jelly (Vaseline or Aquaphor). This will help to smooth the skin and improve the appearance of the scar. The color of your scar will fade over time, but it may be pink for several months after the procedure. The scar may take 6 months to 1 year to reach its final color and appearance. 4. \"Spitting\" suture. Occasionally, an inside suture (stitch) does not completely dissolve. When this happens, (generally 4-8 weeks after surgery), it causes a bump or \"pimple\" to form on the scar. This is easily removed and is not at all serious. It does not mean the skin cancer has returned. Contact us if it happens, but do not be alarmed. Vitamin E oil is NOT necessary. A good moisturizer is just as effective. Sunscreen IS necessary. Use at least and SPF 30 sunscreen daily- even in winter    Call us at [phone] right away if you have any of the following symptoms:  -Bleeding that you can not stop (see highlighted area above). -Pain that lasts longer than 48 hours.  -Your wound becomes more painful, red or hot. -Bruising and swelling that does not begin to improve within the 48 hours or gets worse suddenly.

## 2021-01-28 ENCOUNTER — TELEPHONE (OUTPATIENT)
Dept: SURGERY | Age: 81
End: 2021-01-28

## 2021-02-03 ENCOUNTER — OFFICE VISIT (OUTPATIENT)
Dept: SURGERY | Age: 81
End: 2021-02-03

## 2021-02-03 VITALS — TEMPERATURE: 97.2 F

## 2021-02-03 DIAGNOSIS — Z48.02 VISIT FOR SUTURE REMOVAL: Primary | ICD-10-CM

## 2021-02-03 PROCEDURE — 99024 POSTOP FOLLOW-UP VISIT: CPT | Performed by: DERMATOLOGY

## 2021-02-04 NOTE — PROGRESS NOTES
S:  The patient is here for suture removal s/p Mohs surgery on the right ear and Full Thickness Skin Graft repair, 1 week(s) ago. The site appears well-healed without signs of infection (redness, pain or discharge). The sutures were removed. Looks good. Wound care and activity instructions given. The patient was scheduled for follow-up in 1 week for scar/wound check. The patient was scheduled for f/u with General Dermatology per their instructions. No upstaging based on pathology from debulk specimen as no perineural invasion seen and the tumor was not poorly differentiated.

## 2021-02-09 ENCOUNTER — PROCEDURE VISIT (OUTPATIENT)
Dept: SURGERY | Age: 81
End: 2021-02-09
Payer: MEDICARE

## 2021-02-09 VITALS — DIASTOLIC BLOOD PRESSURE: 70 MMHG | SYSTOLIC BLOOD PRESSURE: 145 MMHG | TEMPERATURE: 97.5 F

## 2021-02-09 DIAGNOSIS — Z51.89 VISIT FOR WOUND CHECK: ICD-10-CM

## 2021-02-09 DIAGNOSIS — C44.41 BASAL CELL CARCINOMA OF LEFT SIDE OF NECK: ICD-10-CM

## 2021-02-09 DIAGNOSIS — C44.311 BASAL CELL CARCINOMA OF NOSE: Primary | ICD-10-CM

## 2021-02-09 PROCEDURE — 17311 MOHS 1 STAGE H/N/HF/G: CPT | Performed by: DERMATOLOGY

## 2021-02-09 PROCEDURE — 12052 INTMD RPR FACE/MM 2.6-5.0 CM: CPT | Performed by: DERMATOLOGY

## 2021-02-09 PROCEDURE — 17312 MOHS ADDL STAGE: CPT | Performed by: DERMATOLOGY

## 2021-02-09 PROCEDURE — 12042 INTMD RPR N-HF/GENIT2.6-7.5: CPT | Performed by: DERMATOLOGY

## 2021-02-09 NOTE — PROGRESS NOTES
MOHS PROCEDURE NOTE    PHYSICIAN:  Arthur Davis. Zhao Walker MD    ASSISTANT: Kandy Waterman RN     REFERRING PROVIDER:  Sandro Cespedes D.O.    PREOPERATIVE DIAGNOSIS: Nodular Basal Cell Carcinoma     SPECIFIC MOHS INDICATIONS:  location    AUC SCORIN/9    POSTOPERATIVE DIAGNOSIS: SAME    LOCATION: Left nasal dorsum    OPERATIVE PROCEDURE:  MOHS MICROGRAPHIC SURGERY    RECONSTRUCTION OF DEFECT: Intermediate layered closure    PREOPERATIVE SIZE: 6x5 MM    DEFECT SIZE: 19x14 MM    LENGTH OF REPAIRED WOUND/SIZE OF FLAP/SIZE OF GRAFT:  35 MM    ANESTHESIA: 9 mL 1% lidocaine with epinephrine 1:100,000 buffered. EBL:  MINIMAL    DURATION OF PROCEDURE:  4 HOURS    POSTOPERATIVE OBSERVATION: 0.75 HOUR    SPECIMENS:  SEE MOHS MAP    COMPLICATIONS:  NONE    DESCRIPTION OF PROCEDURE:  The patient was given a mirror, as appropriate, and the biopsy site was identified, marked with a surgical marking pen, and verified by the patient. Options for treatment were discussed and the patient was informed that Mohs surgery was the selected treatment based on its lower recurrence rate, given the features listed above, as compared to other treatment modalities such as excision, radiation, or curettage, and agreed with this treatment plan. Risks and benefits including bruising, swelling, bleeding, infection, nerve injury, recurrence, and scarring were discussed with the patient prior to the procedure and a written consent detailing these and other risks was reviewed with the patient and signed. There was a time out for person and procedure verification. The surgical site was prepped with an antiseptic solution. Application of an antiseptic solution was repeated before each surgical stage. Stage I:  The clinically-apparent tumor was carefully defined and debulked, determining the edge of the surgical excision.     A thin layer of tumor-laden tissue was excised with a narrow margin of normal-appearing skin, using the technique of Mohs. A map was prepared to correspond to the area of skin from which it was excised. Hemostasis was achieved using electrosurgery. The wound was bandaged. The tissue was prepared for the cryostat and sectioned. 1 section(s) prepared. Each section was coded, cut, and stained for microscopic examination. The entire base and margins of the excised piece of tissue were examined by the surgeon. The tissue was examined to the level of subcut fat. Stage I, II:  Superficial BCC: isolated basaloid lobules projecting from the lower margin of the epidermis and Nodular BCC: large basaloid lobules of varying shape and size with peripheral palisading present around the rim of the lobule, with retraction of the tumor lobules from their associated stroma. The remaining tumor was noted and the next stage was performed. Stage II, III:  A thin layer of tissue was removed at the histologically-identified sites of remaining tumor. The entire procedure as described in stage I was repeated to process the tissue according to Mohs technique. 1 section(s) prepared for stage II and III. Veronica Johns No tumor was identified at the peripheral margins of stage III of microscopically controlled surgery. 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 Will recommend topical tx with efudex or efudex and calcipotriene at pt follow up in one week. DEFECT MANAGEMENT:    REPAIR DESCRIPTION:  Various closure modalities were discussed with the patient, and it was decided that an intermediate layered repair would best preserve normal anatomic and functional relationships. Additional risk of wound dehiscence was discussed. The area was anesthetized with 1% lidocaine with epinephrine 1:100,000 buffered, was given a sterile prep using Chlorhexidine gluconate 4% solution and draped in the usual sterile fashion.  Recreation and enlargement of the wound was performed by excising cones of tissue via the triangulation technique. The final incision lines were placed with respect for the patient's natural skin tension lines in a linear configuration to avoid functional and aesthetic distortion of adjacent free margins. Following minimal undermining, meticulous hemostasis was obtained with spot monopolar electrocoagulation. Subcutaneous dead space and dermis were closed using 5-0 Vicryl buried subcutaneous interrupted suture and the epidermis was approximated with 6-0 Ethilon running epidermal sutures. WOUND COVERAGE:  The wound was cleaned with normal saline solution, dried off, Aquaphor ointment was applied, and the wound was covered. A dressing was applied for stabilization and light pressure. The patient was given detailed oral and written instructions on postoperative care. There were no complications. The patient left the Unit in good medical condition. FOLLOW-UP:  The patient will return for suture removal in 7 days.

## 2021-02-09 NOTE — PROGRESS NOTES
MOHS PROCEDURE NOTE    PHYSICIAN:  Kavya Corbett. Parish Vines MD    ASSISTANT: Richmond Motley RN     REFERRING PROVIDER:  Emilia Cespedes D.O.    PREOPERATIVE DIAGNOSIS: Nodular Basal Cell Carcinoma     SPECIFIC MOHS INDICATIONS:  size and location    AUC SCORIN/9    POSTOPERATIVE DIAGNOSIS: SAME    LOCATION: Left lateral neck    OPERATIVE PROCEDURE:  MOHS MICROGRAPHIC SURGERY    RECONSTRUCTION OF DEFECT: Intermediate layered closure    PREOPERATIVE SIZE: 24x8 MM    DEFECT SIZE: 29x18 MM    LENGTH OF REPAIRED WOUND/SIZE OF FLAP/SIZE OF GRAFT:  51 MM    ANESTHESIA: 14 mL 1% lidocaine with epinephrine 1:100,000 buffered. EBL:  MINIMAL    DURATION OF PROCEDURE:  4 HOURS    POSTOPERATIVE OBSERVATION: 0.75 HOUR    SPECIMENS:  SEE MOHS MAP    COMPLICATIONS:  NONE    DESCRIPTION OF PROCEDURE:  The patient was given a mirror, as appropriate, and the biopsy site was identified, marked with a surgical marking pen, and verified by the patient. Options for treatment were discussed and the patient was informed that Mohs surgery was the selected treatment based on its lower recurrence rate, given the features listed above, as compared to other treatment modalities such as excision, radiation, or curettage, and agreed with this treatment plan. Risks and benefits including bruising, swelling, bleeding, infection, nerve injury, recurrence, and scarring were discussed with the patient prior to the procedure and a written consent detailing these and other risks was reviewed with the patient and signed. There was a time out for person and procedure verification. The surgical site was prepped with an antiseptic solution. Application of an antiseptic solution was repeated before each surgical stage. Stage I:  The clinically-apparent tumor was carefully defined and debulked, determining the edge of the surgical excision.     A thin layer of tumor-laden tissue was excised with a narrow margin of normal-appearing skin, using the technique of Mohs. A map was prepared to correspond to the area of skin from which it was excised. Hemostasis was achieved using electrosurgery. The wound was bandaged. The tissue was prepared for the cryostat and sectioned. 1 section(s) prepared. Each section was coded, cut, and stained for microscopic examination. The entire base and margins of the excised piece of tissue were examined by the surgeon. The tissue was examined to the level of subcut fat. No tumor was identified at the peripheral margins of stage I of microscopically controlled surgery. DEFECT MANAGEMENT:    REPAIR DESCRIPTION:  Various closure modalities were discussed with the patient, and it was decided that an intermediate layered repair would best preserve normal anatomic and functional relationships. Additional risk of wound dehiscence was discussed. The area was anesthetized with 1% lidocaine with epinephrine 1:100,000 buffered, was given a sterile prep using Chlorhexidine gluconate 4% solution and draped in the usual sterile fashion. Recreation and enlargement of the wound was performed by excising cones of tissue via the triangulation technique. The final incision lines were placed with respect for the patient's natural skin tension lines in a linear configuration to avoid functional and aesthetic distortion of adjacent free margins. Following minimal undermining, meticulous hemostasis was obtained with spot monopolar electrocoagulation. Subcutaneous dead space and dermis were closed using 4-0 Vicryl buried subcutaneous interrupted suture and the epidermis was approximated with 5-0 Fast absorbing gut running epidermal sutures. WOUND COVERAGE:  The wound was cleaned with normal saline solution, dried off, Aquaphor ointment was applied, and the wound was covered. A dressing was applied for stabilization and light pressure.   The patient was given detailed oral and written instructions on postoperative care. There were no complications. The patient left the Unit in good medical condition. FOLLOW-UP:  As dissolving sutures were placed, the patient was asked to return if any questions or concerns arose, but otherwise will return to see general dermatology per their instructions.

## 2021-02-09 NOTE — PATIENT INSTRUCTIONS
[facility] Office Hours:    Monday-Thursday  7:30 AM-4:30 PM    Friday  9:00 AM-1:00 PM        POST-OPERATIVE CARE FOR STICHES  Bandage change after 48 hours    CARING FOR YOUR SURGICAL SITE  The bandage should remain on and completely dry for 48 hours. Do NOT get the bandage wet. 1. After the first 48 hours, gently remove the remaining part of the bandage. It can be helpful to moisten the bandage edges in the shower. Steri strips may still be on the wound. It is ok, they will fall off slowly with the daily bandage changes. 2. Gently clean the wound daily with mild soap and water. Try to clean off crust and debris. 3. Dry (pat) the area with a clean Q-tip or gauze. 4. Apply a layer of Vaseline/ Aquaphor (or Bacitracin if your doctor recommends) to the wound area only. 5. Cut a piece of Telfa (or any non-stick dressing) to fit just over the wound and secure it with paper tape. If the wound is small you may use a Band- Aid. Keep area covered for a total of 1 week(s). If the dressing comes off or if you have questions, or concerns about the dressing, please call the office for instructions! POST OPERATIVE INSTRUCTIONS    1. Activity: Do not lift anything heavier than a gallon of milk for 1 week. Also, avoid strenuous activity such as running, power walking or contact sports. 2. Eating and drinking: Do not drink alcohol for 48 hours after your procedure. Alcohol increases the chances of bleeding. 3. Medicines   -If you have discomfort, take Acetaminophen (Tylenol or Extra Strength Tylenol). Follow the instructions and warning on the bottle. -If your doctor has prescribed you an Aspirin daily, please keep taking it. Do not take extra Aspirin or medicines containing Aspirin (such as [place] and Excedrin) for 48 hours after your procedure. Bleeding: If bleeding occurs, DO NOT remove the bandage. Put firm pressure on the area with gauze for 20 minutes without peeking.  If the bleeding continues, apply pressure for another 20 minutes. If the bleeding does not stop after you apply pressure, call us right away. If you can not call, go to the nearest emergency room or urgent care facility. What to expect:  You may have these symptoms. They are normal and should get better with time:  1. Swelling. Swelling usually increases for the first 48 hours after your procedure and then begins to improve. Some soreness and redness around your wound. If we worked close to your eyes (forehead, nose, temple, or upper cheeks) your eyes may become swollen and/ or black and blue. 2. Bruising, which could last 1 week or more. 3. Pink and bumpy appearance to the scar. This may happen a few weeks after your procedure. After 4 weeks, you may gently massage the area each day with facial moisturizer or petroleum jelly (Vaseline or Aquaphor). This will help to smooth the skin and improve the appearance of the scar. The color of your scar will fade over time, but it may be pink for several months after the procedure. The scar may take 6 months to 1 year to reach its final color and appearance. 4. \"Spitting\" suture. Occasionally, an inside suture (stitch) does not completely dissolve. When this happens, (generally 4-8 weeks after surgery), it causes a bump or \"pimple\" to form on the scar. This is easily removed and is not at all serious. It does not mean the skin cancer has returned. Contact us if it happens, but do not be alarmed. Vitamin E oil is NOT necessary. A good moisturizer is just as effective. Sunscreen IS necessary. Use at least and SPF 30 sunscreen daily- even in winter    Call us at [phone] right away if you have any of the following symptoms:  -Bleeding that you can not stop (see highlighted area above). -Pain that lasts longer than 48 hours.  -Your wound becomes more painful, red or hot.   -Bruising and swelling that does not begin to improve within the 48 hours or gets worse suddenly.

## 2021-02-10 ENCOUNTER — TELEPHONE (OUTPATIENT)
Dept: SURGERY | Age: 81
End: 2021-02-10

## 2021-02-10 NOTE — TELEPHONE ENCOUNTER
The patient was in the office on 2/9/21 for Mohs located on the L nasal dorsum and L lateral neck with ILC repairs. The patient tolerated the procedure well and left the office in good condition. Pain level on post-operative day 1:  present - adequately treated    Any bleeding episode that required pressure to be held, bandage change or a call to the office or MD?  no     Any other issues?:  no    A post-operative telephone call was placed at 3:32PM in order to check on the patient's recovery process. The patient reported doing well and had no complaints other than those listed above, if any. All of the patient's questions were answered.

## 2021-02-11 ENCOUNTER — PROCEDURE VISIT (OUTPATIENT)
Dept: DERMATOLOGY | Age: 81
End: 2021-02-11
Payer: MEDICARE

## 2021-02-11 VITALS — TEMPERATURE: 98.6 F | DIASTOLIC BLOOD PRESSURE: 70 MMHG | SYSTOLIC BLOOD PRESSURE: 154 MMHG

## 2021-02-11 DIAGNOSIS — C44.519 BASAL CELL CARCINOMA OF CHEST: Primary | ICD-10-CM

## 2021-02-11 PROCEDURE — 11602 EXC TR-EXT MAL+MARG 1.1-2 CM: CPT | Performed by: DERMATOLOGY

## 2021-02-11 PROCEDURE — 12032 INTMD RPR S/A/T/EXT 2.6-7.5: CPT | Performed by: DERMATOLOGY

## 2021-02-11 NOTE — PATIENT INSTRUCTIONS
Sun Protection Tips    Apply broad spectrum water resistant sunscreen with an SPF of at least 30 to exposed areas of the skin. Dont forget the ears and lips! Remember to reapply sunscreen about every 2 hours and after swimming or sweating. Wear sun protective clothing. Swim shirts (aka. rash guards) are a great idea and negates the need to reapply sunscreen in those areas. Seek the shade whenever possible especially between the hours of 10am and 4pm when the suns rays are the strongest.     Avoid tanning beds          Wear a wide brim hat while in the sun        Surgical Wound Care Instructions  Sutured wounds:  · After your surgery, go home and take it easy. Please refrain from any vigorous physical activity or heavy lifting. · Leave the pressure bandage in place for 48 hours. If it starts to detach, reinforce the bandage with another piece of tape. · Please keep the bandage dry for 48 hours  · After 48 hours, the wound can get wet. Clean the area daily using mild soapy water and a gauze pad or cotton tipped applicator, applying gentle pressure. · Apply a thin layer of Vaseline or petroleum jelly. · Cut a Telfa pad in the shape of the wound but slightly larger and secure with tape. Special sites:  Facial sites:  · Keep the wound elevated for the first 2 nights. · Do not sleep on the side of the body where the wound is located. · Do not bend your head lower than your heart or engage in heavy lifting. Leg:  · Keep the leg elevated when reclining, using a pillow to elevate it. Complications:  · If bleeding develops when you go home, apply pressure for 15 minutes continuously with no peeking. A small amount of ice in a bag covered with a towel may be used for another 10 minutes if necessary. If the bleeding does not stop, please call us [phone]. · Please call if you develop any fevers, chills, or pus drains from the wound.   · A small amount of redness at the site of the surgery is normal at first, but if the redness begins to spread and/or pain worsens, you may have an infection and need to call the office. Thanks for your visit! Feel free to send  Dr. Gerri Espinoza assistant, Nahomi, a Re-[employer] message/call for any questions, concerns or  to schedule your cosmetic procedures.

## 2021-02-11 NOTE — PROGRESS NOTES
[facility]) Dermatology  Layman DO Juanito, [address]     Candance Butcher  1940    [de-identified] y.o. male     Date of Visit: 2021    Chief Complaint:   Chief Complaint   Patient presents with    Procedure     excision of NBCC of right superior chest        History of Present Illness:  Candance Butcher is a [de-identified] y.o. male who presents with the chief complaint of follow up for the followin. Biopsy proven ulcerated nodular BCC located on right superior chest, presents today for surgical excision. No concerns since biopsy. Pacemaker/ICD: No  Joint prosthesis in the last 2 years: No  Difficulty with numbing in the past: No  Local Anesthesia Reaction: No  Artificial Heart Valve: No  Organ Transplant: No  Immunosuppression: No  Bleeding/Clotting Disorders: No  Anticoagulant Therapy: No    The patient was counseled at length about the risks of nirmala Covid-19 during their perioperative period and any recovery window from their procedure. The patient was made aware that nirmala Covid-19  may worsen their prognosis for recovering from their procedure  and lend to a higher morbidity and/or mortality risk. All material risks, benefits, and reasonable alternatives including postponing the procedure were discussed. The patient does wish to proceed with the procedure at this time. Review of Systems:  Constitutional: Reports general sense of well-being   Skin: No new or changing moles, no history of keloids or hypertrophic scars. Heme: No abnormal bruising or bleeding. Past Medical History, Family History, Surgical History, Medications and Allergies reviewed      History reviewed. No pertinent family history.   Past Medical History:   Diagnosis Date    BCC (basal cell carcinoma of skin)     SCC (squamous cell carcinoma)      Past Surgical History:   Procedure Laterality Date    MOHS SURGERY         Allergies   Allergen Reactions    Morphine      Outpatient Medications Marked as Taking for the 2/11/21 encounter (Procedure visit) with Donna Kendrick, DO   Medication Sig Dispense Refill    mupirocin (BACTROBAN) 2 % ointment Apply to affected area(s) right arm BID for 2 weeks or until healed 22 g 0    gabapentin (NEURONTIN) 300 MG capsule       ibuprofen (ADVIL;MOTRIN) 200 MG tablet Take 400 mg by mouth 2 times daily       aspirin 81 MG tablet Take 81 mg by mouth      metFORMIN (GLUCOPHAGE-XR) 500 MG extended release tablet       quinapril-hydrochlorothiazide (ACCURETIC) 20-12.5 MG per tablet          Social History:   Social History     Socioeconomic History    Marital status:      Spouse name: Not on file    Number of children: Not on file    Years of education: Not on file    Highest education level: Not on file   Occupational History    Not on file   Social Needs    Financial resource strain: Not on file    Food insecurity     Worry: Not on file     Inability: Not on file    Transportation needs     Medical: Not on file     Non-medical: Not on file   Tobacco Use    Smoking status: Never Smoker    Smokeless tobacco: Never Used   Substance and Sexual Activity    Alcohol use: Not on file    Drug use: Not on file    Sexual activity: Not on file   Lifestyle    Physical activity     Days per week: Not on file     Minutes per session: Not on file    Stress: Not on file   Relationships    Social connections     Talks on phone: Not on file     Gets together: Not on file     Attends Scientologist service: Not on file     Active member of club or organization: Not on file     Attends meetings of clubs or organizations: Not on file     Relationship status: Not on file    Intimate partner violence     Fear of current or ex partner: Not on file     Emotionally abused: Not on file     Physically abused: Not on file     Forced sexual activity: Not on file   Other Topics Concern    Not on file   Social History Narrative    Not on file       Physical Examination -Constitutional: Well appearing, no acute distress  -Neurological: Alert and oriented X 3  -Mood and Affect: Pleasant    BP: 157/80  1. R superior chest- 1.0cm pink scaly papule at site of biopsied [facility]            Assessment and Plan     1. Basal cell carcinoma of chest        1. Basal cell carcinoma of chest  DESCRIPTION OF PROCEDURE:  The patient was given a mirror, as appropriate, and the biopsy site was identified, marked with a surgical marking pen, and verified by the patient. Details of surgical excision treatment were discussed with patient including rationale of treatment, outline of procedure, risk of recurrence. Risks and benefits including bruising, swelling, bleeding, infection, nerve injury, discomfort, risk of recurrence, wound dehiscence, pain. and scarring were discussed with the patient prior to the procedure and a written consent detailing these and other risks was reviewed with the patient and signed. Patient elected to proceed with surgical excision today. There was a time out for person and procedure verification. Area(s) to be excised were marked with a surgical pen. The surgical site was prepped with an antiseptic solution and draped in a sterile fashion. Local anesthesia achieved with 1% lidocaine w/ epinephrine/sodium bicarbonate. Elliptical excision to superficial subcutaneous fat performed. Specimen sent to pathology. Edges undermined and hemostasis achieved w/ pinpoint electrodessication. Layered closure with 3-0 buried subcutaneous Vicryl sutures and 5-0 running epidermal Ethilon sutures.   -Width of lesion w/ 3-4 mm margins - 1.8 cm; length of repair 6.0 cm.       WOUND COVERAGE:  The wound was cleaned with normal saline solution, dried off, petroleum ointment was applied, and the wound was covered. A pressure dressing was applied for stabilization and light pressure. The patient was given detailed oral and written instructions on postoperative care.       There were no

## 2021-02-15 ENCOUNTER — TELEPHONE (OUTPATIENT)
Dept: SURGERY | Age: 81
End: 2021-02-15

## 2021-02-15 NOTE — TELEPHONE ENCOUNTER
Contacted pt to see if he was keeping appointment for tomorrow due to inclement weather. The pt was unable to be reached and a voice message was left requesting that he call office back to change appointment.

## 2021-02-16 LAB — DERMATOLOGY PATHOLOGY REPORT: ABNORMAL

## 2021-02-17 ENCOUNTER — NURSE ONLY (OUTPATIENT)
Dept: SURGERY | Age: 81
End: 2021-02-17

## 2021-02-17 DIAGNOSIS — Z48.02 VISIT FOR SUTURE REMOVAL: ICD-10-CM

## 2021-02-17 DIAGNOSIS — Z51.89 VISIT FOR WOUND CHECK: Primary | ICD-10-CM

## 2021-02-18 NOTE — PROGRESS NOTES
S:  The patient is here for suture removal s/p Mohs surgery on the left nasal dorsum and Intermediate layered closure repair, 8 days ago. The site appears well-healed without signs of infection (redness, pain or discharge). The sutures were removed. Vaseline was applied. Wound care and activity instructions given. The patient was scheduled for follow-up prn for scar/wound check. The patient was scheduled for f/u with General Dermatology per their instructions. S: The patient is also here for wound check s/p Mohs on the left lateral neck with Intermediate layered closure repair, 8 days ago. The patient denies any complaints and feels the area is healing well without complications. O: The wound/scar shows no signs of infection/bleeding or other complications (no erythema, edema, pain, purulence or drainage). A/P:  No issues. Patient questions answered and expectations reviewed. The patient is scheduled for f/u skin examination with General Dermatology per their recommendations.

## 2021-02-22 ENCOUNTER — NURSE ONLY (OUTPATIENT)
Dept: DERMATOLOGY | Age: 81
End: 2021-02-22

## 2021-02-22 ENCOUNTER — TELEPHONE (OUTPATIENT)
Dept: SURGERY | Age: 81
End: 2021-02-22

## 2021-02-22 VITALS — TEMPERATURE: 96.3 F

## 2021-02-22 DIAGNOSIS — C44.519 BASAL CELL CARCINOMA OF CHEST: ICD-10-CM

## 2021-02-22 DIAGNOSIS — T81.49XA WOUND INFECTION AFTER SURGERY: Primary | ICD-10-CM

## 2021-02-22 PROCEDURE — 99024 POSTOP FOLLOW-UP VISIT: CPT | Performed by: DERMATOLOGY

## 2021-02-22 NOTE — TELEPHONE ENCOUNTER
----- Message from Sangita Klein MD sent at 2/22/2021 11:42 AM EST -----  Thank you we will call the pt and check in. Please let me know when you receive the swab results. I will call him in an antibiotic. Nurses - please call in pt keflex 500mg tid for 5 days and call the pt and see if he can come in later this week.   ----- Message -----  From: Regino Larkin  Sent: 2/22/2021  11:06 AM EST  To: Sangita Klein MD, DO Rosaura Everett,      Pt was in the office for suture removal on his chest. He requested I look at his MOHS incision site on his left lateral neck as the pain was increasing. I had Dr. Miguel Tirado come in and look at it, she requested I send you both a message. I took an ROSA M swab, and a photo is in the media of his chart.        Thanks, Nahomi ( Dr. Saba Bangura assistant)

## 2021-02-22 NOTE — TELEPHONE ENCOUNTER
Called and spoke with Cullen Vidal, pharmacist at [facility]. Called in keflex 500 mg oral three times a day for 5 days.

## 2021-02-24 ENCOUNTER — OFFICE VISIT (OUTPATIENT)
Dept: SURGERY | Age: 81
End: 2021-02-24
Payer: MEDICARE

## 2021-02-24 DIAGNOSIS — Z51.89 VISIT FOR WOUND CHECK: Primary | ICD-10-CM

## 2021-02-24 PROCEDURE — 99213 OFFICE O/P EST LOW 20 MIN: CPT | Performed by: DERMATOLOGY

## 2021-02-24 PROCEDURE — 1036F TOBACCO NON-USER: CPT | Performed by: DERMATOLOGY

## 2021-02-24 PROCEDURE — G8427 DOCREV CUR MEDS BY ELIG CLIN: HCPCS | Performed by: DERMATOLOGY

## 2021-02-24 PROCEDURE — 1123F ACP DISCUSS/DSCN MKR DOCD: CPT | Performed by: DERMATOLOGY

## 2021-02-24 PROCEDURE — G8421 BMI NOT CALCULATED: HCPCS | Performed by: DERMATOLOGY

## 2021-02-24 PROCEDURE — 4040F PNEUMOC VAC/ADMIN/RCVD: CPT | Performed by: DERMATOLOGY

## 2021-02-24 PROCEDURE — G8484 FLU IMMUNIZE NO ADMIN: HCPCS | Performed by: DERMATOLOGY

## 2021-02-24 NOTE — Clinical Note
Please call mr buenrostro and let him know based on culture results I need to change his antibiotic.   He can stop keflex and then start with doxy

## 2021-02-25 ENCOUNTER — TELEPHONE (OUTPATIENT)
Dept: SURGERY | Age: 81
End: 2021-02-25

## 2021-02-25 RX ORDER — DOXYCYCLINE HYCLATE 100 MG
100 TABLET ORAL 2 TIMES DAILY
Qty: 10 TABLET | Refills: 0 | Status: SHIPPED | OUTPATIENT
Start: 2021-02-25 | End: 2021-03-02

## 2021-02-25 NOTE — TELEPHONE ENCOUNTER
----- Message from Sangita Klein MD sent at 2/25/2021  3:39 PM EST -----  Please call mr buenrostro and let him know based on culture results I need to change his antibiotic.   He can stop keflex and then start with doxy

## 2021-02-25 NOTE — PROGRESS NOTES
S: The patient is here today for wound/scar check. The patient had Mohs surgery located on the posterior neck with Intermediate layered closure repair, 2 week(s) ago. The patient was noted to have drainage at an appt with Dr. Shelley Torres office for suture removal of another site. cx was taken at that time and a prescription was sent in for keflex 500mg tid. EXAM: purulent material drained from wound as much as possible. 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 Will send a new prescription for doxycyline 500mg bid for another 5 days. rtc in one week.

## 2021-02-27 LAB
ANAEROBIC CULTURE: ABNORMAL
GRAM STAIN RESULT: ABNORMAL
ORGANISM: ABNORMAL
WOUND/ABSCESS: ABNORMAL

## 2021-03-02 ENCOUNTER — OFFICE VISIT (OUTPATIENT)
Dept: SURGERY | Age: 81
End: 2021-03-02

## 2021-03-02 DIAGNOSIS — Z51.89 VISIT FOR WOUND CHECK: Primary | ICD-10-CM

## 2021-03-02 PROCEDURE — 99024 POSTOP FOLLOW-UP VISIT: CPT | Performed by: DERMATOLOGY

## 2021-03-02 NOTE — Clinical Note
[facility] -   I talked with Rico Simeon about a shorter course type tx for his actinic damage, I.e. combo efudex with calcipotriene or even picato and he said he might consider it and had a relative who had recently done a similar thing he would talk to and get his opinion on.   He said he would talk to you more about it at his next visit and maybe he would be willing to give it a try:)

## 2021-03-23 NOTE — PATIENT INSTRUCTIONS
Sun Protection Tips    Apply broad spectrum water resistant sunscreen with an SPF of at least 30 to exposed areas of the skin. Dont forget the ears and lips! Remember to reapply sunscreen about every 2 hours and after swimming or sweating. Wear sun protective clothing. Swim shirts (aka. rash guards) are a great idea and negates the need to reapply sunscreen in those areas. Seek the shade whenever possible especially between the hours of 10am and 4pm when the suns rays are the strongest.     Avoid tanning beds          Wear a wide brim hat while in the sun    Biopsy Wound Care Instructions   Cleanse the wound twice a day with mild soapy water.  Gently dry the area.  Apply Vaseline/Aquaphor to the wound using a cotton tipped applicator or Qtip.  Cover with a clean bandage.  Repeat this process until the biopsy site is healed. You will know when it's healed when it's pink and shiny.  You may shower and bathe as usual.      If bleeding should occur, apply firm pressure to bleeding area for 15 minutes and repeat if needed. If bleeding continues after 30 minutes, please call office and ask to speak to Nahomi.        ** Biopsy results generally take around 7-10  business days to come back. A member of Dr. Blanca Comer staff will call you when the results are have been reviewed and a personalized treatment plan has been established. If you don't hear from our staff after the 10 business days, please call our office for your results. Cryosurgery (Freezing) Wound Care Instructions    AFTER THE PROCEDURE:    You will notice swelling and redness around the site. This is normal.    You may experience a sharp or sore feeling for the next several days. For this discomfort, you may take acetaminophen (Tylenol©).  A blister may develop at the treated area, sometimes as soon as by the end of the day. After several days, the blister will subside and a scab will form.     If the area is bumped or traumatized during the first few days following freezing, you may develop bleeding into the blister, forming a blood blister. This is nothing to be alarmed about.  If the blister is tense, uncomfortable, or much larger than the site that was frozen, you may pop the blister along its edge with a sterile needle (boiled, heated under a flame, or cleaned with alcohol) to allow the fluid to drain out. If the blister does not bother you, no treatment is needed.  Do NOT peel off the top of the blister roof. It will act as a dressing on top of your wound. WOUND CARE:    You may shower or bathe as usual, but avoid scrubbing the areas that have been frozen.  Cleanse the site twice a day with mild soapy water, and then apply a thin film of white petrolatum (Vaseline©).  You do not need to cover the area, but can if you prefer.  Do NOT allow the site to become dry or crusted, or attempt to dry it out with rubbing alcohol or hydrogen peroxide.  Continue this regimen until the area is pink and healed. Depending on the size and location of your cryosurgery site, healing may take 2 to 4 weeks.  The area may continue to be pink for several weeks, and over the next few months may become darker or lighter than the surrounding skin. This may be a permanent change. Thanks for your visit! Feel free to send  Dr. Gage Hidden assistant, Nahomi, a [id number] message/call for any questions, concerns or  to schedule your cosmetic procedures.

## 2021-03-25 ENCOUNTER — OFFICE VISIT (OUTPATIENT)
Dept: DERMATOLOGY | Age: 81
End: 2021-03-25
Payer: MEDICARE

## 2021-03-25 VITALS — TEMPERATURE: 97.2 F

## 2021-03-25 DIAGNOSIS — Z85.828 HISTORY OF NONMELANOMA SKIN CANCER: ICD-10-CM

## 2021-03-25 DIAGNOSIS — L57.0 MULTIPLE ACTINIC KERATOSES: ICD-10-CM

## 2021-03-25 DIAGNOSIS — D48.9 NEOPLASM OF UNCERTAIN BEHAVIOR: Primary | ICD-10-CM

## 2021-03-25 PROCEDURE — 17000 DESTRUCT PREMALG LESION: CPT | Performed by: DERMATOLOGY

## 2021-03-25 PROCEDURE — 99214 OFFICE O/P EST MOD 30 MIN: CPT | Performed by: DERMATOLOGY

## 2021-03-25 PROCEDURE — 11103 TANGNTL BX SKIN EA SEP/ADDL: CPT | Performed by: DERMATOLOGY

## 2021-03-25 PROCEDURE — G8427 DOCREV CUR MEDS BY ELIG CLIN: HCPCS | Performed by: DERMATOLOGY

## 2021-03-25 PROCEDURE — 17003 DESTRUCT PREMALG LES 2-14: CPT | Performed by: DERMATOLOGY

## 2021-03-25 PROCEDURE — 11102 TANGNTL BX SKIN SINGLE LES: CPT | Performed by: DERMATOLOGY

## 2021-03-25 PROCEDURE — 1036F TOBACCO NON-USER: CPT | Performed by: DERMATOLOGY

## 2021-03-25 PROCEDURE — 1123F ACP DISCUSS/DSCN MKR DOCD: CPT | Performed by: DERMATOLOGY

## 2021-03-25 PROCEDURE — 4040F PNEUMOC VAC/ADMIN/RCVD: CPT | Performed by: DERMATOLOGY

## 2021-03-25 PROCEDURE — G8421 BMI NOT CALCULATED: HCPCS | Performed by: DERMATOLOGY

## 2021-03-25 PROCEDURE — G8484 FLU IMMUNIZE NO ADMIN: HCPCS | Performed by: DERMATOLOGY

## 2021-03-25 NOTE — PROGRESS NOTES
[facility]) Dermatology  [place], [place], [address]       Laure Fischer  1940    [de-identified] y.o. male     Date of Visit: 3/25/2021    Chief Complaint:   Chief Complaint   Patient presents with    Skin Lesion     spot / Anila Sands        I was asked to see this patient by Dr. Payton ref. provider found. History of Present Illness:  Laure Fischer is a [de-identified] y.o. male who presents with the chief complaint of the followin. Patient request waist up skin exam only today for spots. Many year history of multiple nevi on the head/neck, trunk and upper extremities, all present for many years. Denies new moles. Denies moles changing in size, shape, color. None associated w/ pain, bleeding, pruritus. 2.  Complains of an enlarging scaly crusted area to his right preauricular cheek. 3.  Complains of a tender bump to his vertex scalp when he hawk his hair. 4.  Chronic history of multiple actinic keratoses to sun exposed areas status post cryotherapy. Unsure if any have recurred. Continues to feel dry rough feeling spots to scalp and face.  -Patient was advised to use topical Efudex in 2017 by [facility] dermatology department as well as to schedule for PDT phototherapy. Javier Reynoso declined at that time and wanted to switch doctors. Continues to decline field therapy options including topical Efudex and PDT.   5.  History of multiple nonmelanoma skin cancers, patient denies recurrence.     Admits to sun exposure in youth without wearing sunscreen, hats, or protective clothing.  Works outdoors often, Denies regularly wearing sunscreen or protective hats/clothing when outdoors currently. Review of Systems:  Constitutional: Reports general sense of well-being   Skin: No new or changing moles, no tendency to develop thick scars, no interval of severe sunburns  Heme: No abnormal bruising or bleeding.       Past Skin Hx:  -2021-nodular BCC located to left lateral neck status post Mohs surgery by Dr. Malick Villa  -2/2021-invasive moderately differentiated SCC located to superior helix of right ear status post Mohs surgery by Dr. Malick Villa  -2/2021-nodular [address] to left nasal dorsum status post Mohs surgery Dr. Malick Villa  -2/2021-nodular [address] to right superior chest status post surgical excision  -11/2020-moderately differentiated SCC located to superior helix of right ear status, Mohs surgery scheduled with Dr. Malick Villa on 1/27/2021  -9/2020-actinic keratosis to right preauricular cheek status post cryotherapy  -9/2020-hypertrophic actinic keratosis focally transected at deep margin to left frontal scalp status post cryotherapy  -6/2020-nodular BCC located to posterior neck-status post electrodesiccation and curettage in July 2020  -11/2019-HAK located to right frontal scalp and right dorsal hand- presents today for cryotherapy   -8/2019-history of superficial BCC located left superior chest status post electrodesiccation and curettage  -6/2019-right superior medial chest s/p surgical excision of nodular BCC  -6/2019-right upper arm status post electrodesiccation and curettage of superficial BCC  -11/19/18-moderately differentiated SCC to left malar cheek treated with Mohs surgery by Dr. 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  BCC focally infiltrating located to left dorsal hand  BCC focally infiltrating located to left lateral cheek  BCC metatypical type located to left nasal alar  Superficial SCC moderately differentiated located to right lateral cheek  -hx of multiple AKs s/p cryotherapy, declines field therapy treatment options  -hx of diffuse photoaging of Never Smoker    Smokeless tobacco: Never Used   Substance and Sexual Activity    Alcohol use: Not on file    Drug use: Not on file    Sexual activity: Not on file   Lifestyle    Physical activity     Days per week: Not on file     Minutes per session: Not on file    Stress: Not on file   Relationships    Social connections     Talks on phone: Not on file     Gets together: Not on file     Attends Mor[employer] service: Not on file     Active member of club or organization: Not on file     Attends meetings of clubs or organizations: Not on file     Relationship status: Not on file    Intimate partner violence     Fear of current or ex partner: Not on file     Emotionally abused: Not on file     Physically abused: Not on file     Forced sexual activity: Not on file   Other Topics Concern    Not on file   Social History Narrative    Not on file       Physical Examination     The following were examined and determined to be normal: Psych/Neuro, Conjunctivae/eyelids, Gums/teeth/lips and Neck. Patient declined examination of areas covered by clothing. The following were examined and determined to be abnormal: Scalp/hair and Head/face, RUE, LUE. - Pt declines skin exam underneath clothing. Vance phototype: 2    -Constitutional: Well appearing, no acute distress  -Neurological: Alert and oriented X 3  -Mood and Affect: Pleasant  Areas of skin examined as listed above:   1a. L posterior vertex scalp- 1.0cm erythematous dome-shaped papule with central crusted crateriform erosion      1b. Crown of scalp-0.4x0.3cm asymmetric dark brown macule with ill-defined features on dermoscopy        1c. R preauricular cheek-2.0x1.2cm erythematous hyperkeratotic crusted plaque      2. ill defined irreg shaped gritty keratotic pink macule(s) and papules located to crown and vertex scalp (5), forehead (3), right malar cheek (2), left dorsal hand, left dorsal forearm, right dorsal hand.   Significant background actinic damage/pink scaly gritty macules to sun exposed areas on scalp, face, ears, forearms/hands. 3.  Left lateral neck, superior helix of right ear, left nasal dorsum, right superior chest, posterior neck, left malar cheek, left proximal posterior upper arm, left inferior lateral cheek, right neck, vertex scalp, left dorsal hand, left nasal alar crease, left lateral cheek, right lateral cheek-well-healed scars, clear    Assessment and Plan     1. Neoplasm of uncertain behavior    2. Multiple actinic keratoses    3. History of nonmelanoma skin cancer        1. Neoplasm of uncertain behavior  DDx:  A. Rule out HAK versus SCC  B. Rule out dysplastic nevus versus lentigo maligna melanoma  C. Rule out SCC  -Discussed possible diagnosis. Patient agreeable to biopsy. Verbal consent obtained after risks (infection, bleeding, scar, dyspigmentation), benefits and alternatives explained. -Area(s) to be biopsied were marked with a surgical pen. Site(s) were cleansed with alcohol. Local anesthesia achieved with 1% lidocaine with epinephrine/sodium bicarbonate. Shave biopsy performed with a razor blade. Hemostasis was achieved with aluminum chloride and electrodesiccation. The wound(s) were dressed with petrolatum and covered with a bandage. Specimen(s) sent to pathology. Pt educated re: risk of bleeding, infection, scar, discoloration, and wound care instructions. 2. Multiple actinic keratoses  -Edu re: relationship with chronic cumulative sun exposure, low premalignant potential.   Verbal consent obtained. - crown and vertex scalp (5), forehead (3), right malar cheek (2), left dorsal hand, left dorsal forearm, right dorsal hand, 13 lesion(s) treated w/ liquid nitrogen x 1cycles, 3 seconds each located    Edu re: risk of blister formation, discomfort, scar, dyspigmentation. Discussed wound care.     -Reviewed sun protective behavior -- sun avoidance during the peak hours of the day, sun-protective clothing (including hat and sunglasses), sunscreen use (water resistant, broad spectrum, SPF at least 30, need for reapplication every 2 to 3 hours). -Patient to contact office if AK fails to resolve despite treatment or concern for recurrence (discussed signs/symptoms to monitor for) or if patient develops side effect from therapy, such as unbearable blister, crusting, scabbing, redness, or tenderness. Discussed the option of field therapy given patient's significant actinic damage/actinic keratoses especially to scalp, face/ears, dorsal hands and forearms. Would recommend a 2-week treatment twice daily with likely reapplication needed in the future. We discussed the likely side effects of treatment including redness, crusting, itching and burning, erosions, blistering, pain, and possible flu-like symptoms. Pt informed to stop application and call the office if side effects become intolerable. Strict sun avoidance. Discussed time course for healing.     -Patient declines starting field therapy today, had significant reaction in the past with a prior dermatologist and has remained hesitant ever since. States he is now considering treatment but prefers to wait till the Fall 2021. Return to Clinic: 3 month skin exam   Discussed plan with patient and/or primary caretaker. Patient to call clinic with any questions / concerns. Reviewed proper use and side effects of treatment(s) and/or medication(s) with patient and/or primary caretaker. AVS provided or is available on [facility]     Note is transcribed using voice recognition software. Inadvertent computerized transcription errors may be present.

## 2021-03-29 LAB — DERMATOLOGY PATHOLOGY REPORT: ABNORMAL

## 2021-04-07 ENCOUNTER — TELEPHONE (OUTPATIENT)
Dept: DERMATOLOGY | Age: 81
End: 2021-04-07

## 2021-04-07 DIAGNOSIS — C44.42 SQUAMOUS CELL CARCINOMA OF SCALP: Primary | ICD-10-CM

## 2021-04-07 NOTE — TELEPHONE ENCOUNTER
Pt calling for biopsy results on him and his wife pls return call back @ [phone] if any results are available

## 2021-04-08 NOTE — TELEPHONE ENCOUNTER
Pt called Dr. Dara Del Cid office instead of [facility] office today regarding a call from ONEL Cespedes's office staff. Please return his call.

## 2021-04-09 NOTE — TELEPHONE ENCOUNTER
Called pt, left leilani stating I was calling to discuss bx results and to please return my call. Informed him that I leave at 4pm and am out of office until Tuesday.

## 2021-04-13 NOTE — TELEPHONE ENCOUNTER
Spoke to pt and relayed results. Moved 3 mos appt for 5/18. [facility]  FYI: Pt having knee replacement 4/22.

## 2021-05-18 ENCOUNTER — OFFICE VISIT (OUTPATIENT)
Dept: DERMATOLOGY | Age: 81
End: 2021-05-18
Payer: MEDICARE

## 2021-05-18 VITALS — TEMPERATURE: 98.5 F

## 2021-05-18 DIAGNOSIS — C44.42 SQUAMOUS CELL CARCINOMA OF SCALP: ICD-10-CM

## 2021-05-18 DIAGNOSIS — L57.0 MULTIPLE ACTINIC KERATOSES: Primary | ICD-10-CM

## 2021-05-18 PROCEDURE — 1123F ACP DISCUSS/DSCN MKR DOCD: CPT | Performed by: DERMATOLOGY

## 2021-05-18 PROCEDURE — 17004 DESTROY PREMAL LESIONS 15/>: CPT | Performed by: DERMATOLOGY

## 2021-05-18 PROCEDURE — 1036F TOBACCO NON-USER: CPT | Performed by: DERMATOLOGY

## 2021-05-18 PROCEDURE — G8427 DOCREV CUR MEDS BY ELIG CLIN: HCPCS | Performed by: DERMATOLOGY

## 2021-05-18 PROCEDURE — G8421 BMI NOT CALCULATED: HCPCS | Performed by: DERMATOLOGY

## 2021-05-18 PROCEDURE — 99212 OFFICE O/P EST SF 10 MIN: CPT | Performed by: DERMATOLOGY

## 2021-05-18 PROCEDURE — 4040F PNEUMOC VAC/ADMIN/RCVD: CPT | Performed by: DERMATOLOGY

## 2021-05-18 RX ORDER — CEFADROXIL 500 MG/1
CAPSULE ORAL
COMMUNITY
Start: 2021-04-22

## 2021-05-18 RX ORDER — PREDNISONE 1 MG/1
TABLET ORAL
COMMUNITY
Start: 2021-04-22

## 2021-05-18 RX ORDER — TIZANIDINE 2 MG/1
2 TABLET ORAL EVERY 8 HOURS PRN
COMMUNITY
Start: 2021-04-22

## 2021-05-18 RX ORDER — OXYCODONE HYDROCHLORIDE 5 MG/1
5-10 TABLET ORAL EVERY 8 HOURS PRN
COMMUNITY
Start: 2021-04-22

## 2021-05-18 RX ORDER — TRAMADOL HYDROCHLORIDE 50 MG/1
50-100 TABLET ORAL EVERY 8 HOURS PRN
COMMUNITY
Start: 2021-04-22

## 2021-05-18 RX ORDER — CEPHALEXIN 500 MG/1
CAPSULE ORAL
COMMUNITY
Start: 2021-02-22

## 2021-05-18 NOTE — PROGRESS NOTES
[facility]) Dermatology  [place], [place], [address]       Teresa Swann  1940    80 y.o. male     Date of Visit: 2021    Chief Complaint:   Chief Complaint   Patient presents with    Skin Lesion     spots, face/head        I was asked to see this patient by Dr. Payton ref. provider found. History of Present Illness:  Teresa Swann is a 80 y.o. male who presents with the chief complaint of the followin. Patient declines skin exam anywhere else besides face and scalp. According to medical asssistant, pt states he is in a \"bad mood. \"  States his right knee which was replaced 1 month ago and is in pain today. Patient in wheelchair today. feels dry spots scattered to his forehead, scalp, nose. Biopsy-proven hypertrophic actinic keratosis to right preauricular cheek and crown of scalp. Unsure if they have recurred. 2. History of multiple nonmelanoma skin cancers, patient denies recurrence. Most recent nonmelanoma skin cancer was a moderately differentiated SCC to left posterior vertex scalp, patient states he has an upcoming appointment in  with Dr. Natalie Bellamy surgery. Review of Systems:  Constitutional: Reports general sense of well-being   Skin: No new or changing moles, no tendency to develop thick scars, no interval of severe sunburns  Heme: No abnormal bruising or bleeding.       Past Skin Hx:  -3/2021-moderately differentiated SCC located to left posterior vertex scalp-schedule for Mohs surgery with Dr. Humberto Rivero in 2021  -3/2021-AK with adjacent SK to crown of scalp  -3/2021-hypertrophic AK to right preauricular cheek  -2021-nodular BCC located to left lateral neck status post Mohs surgery by Dr. Humberto Rivero  -2021-invasive moderately differentiated SCC located to superior helix of right ear status post Mohs surgery by Dr. Humberto Rivero  -2021-nodular [address] to left nasal dorsum status post Mohs surgery Dr. Humberto Rivero  -[id number] BCC to right superior chest status post surgical excision  -11/2020-moderately differentiated SCC located to superior helix of right ear status, Mohs surgery scheduled with Dr. Mauro Us on 1/27/2021  -9/2020-actinic keratosis to right preauricular cheek status post cryotherapy  -9/2020-hypertrophic actinic keratosis focally transected at deep margin to left frontal scalp status post cryotherapy  -6/2020-nodular BCC located to posterior neck-status post electrodesiccation and curettage in July 2020  -11/2019-HAK located to right frontal scalp and right dorsal hand- presents today for cryotherapy   -8/2019-history of superficial BCC located left superior chest status post electrodesiccation and curettage  -6/2019-right superior medial chest s/p surgical excision of nodular BCC  -6/2019-right upper arm status post electrodesiccation and curettage of superficial BCC  -11/19/18-moderately differentiated SCC to left malar cheek treated with Mohs surgery by Dr. 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  BCC focally infiltrating located to left dorsal hand  BCC focally infiltrating located to left lateral cheek  BCC metatypical type located to left nasal alar  Superficial SCC moderately differentiated located to right lateral cheek  -hx of multiple AKs s/p cryotherapy, declines field therapy treatment options  -hx of diffuse photoaging of skin       Patient denies past history of melanoma,dysplastic nevi,     PFHx: Denies hx of MM or NMSC    ADDITIONAL HISTORY:    I have reviewed past medical and surgical histories, current medications, allergies, social and family histories as documented in the patient's electronic medical record. History reviewed. No pertinent family history. Past Medical History:   Diagnosis Date    BCC (basal cell carcinoma of skin)     SCC (squamous cell carcinoma)     Skin cancer      Past Surgical History:   Procedure Laterality Date    MOHS SURGERY         Allergies   Allergen Reactions    Morphine      Outpatient Medications Marked as Taking for the 5/18/21 encounter (Office Visit) with Condshashi Grills, DO   Medication Sig Dispense Refill    cefadroxil (DURICEF) 500 MG capsule TAKE 1 CAPSULE BY MOUTH TWICE DAILY FOR 7 DAYS      cephALEXin (KEFLEX) 500 MG capsule TAKE ONE CAPSULE BY MOUTH THREE TIMES DAILY      oxyCODONE (ROXICODONE) 5 MG immediate release tablet Take 5-10 mg by mouth every 8 hours as needed.  predniSONE (DELTASONE) 5 MG tablet TAKE 1 TABLET BY MOUTH DAILY FOR 14 DAYS      tiZANidine (ZANAFLEX) 2 MG tablet Take 2 mg by mouth every 8 hours as needed      traMADol (ULTRAM) 50 MG tablet Take  mg by mouth every 8 hours as needed.       mupirocin (BACTROBAN) 2 % ointment Apply to affected area(s) right arm BID for 2 weeks or until healed 22 g 0    gabapentin (NEURONTIN) 300 MG capsule       ibuprofen (ADVIL;MOTRIN) 200 MG tablet Take 400 mg by mouth 2 times daily       aspirin 81 MG tablet Take 81 mg by mouth      metFORMIN (GLUCOPHAGE-XR) 500 MG extended release tablet       quinapril-hydrochlorothiazide (ACCURETIC) 20-12.5 MG per tablet          Social History:   Social History     Socioeconomic History    Marital status:      Spouse name: Not on file    Number of children: Not on file    Years of education: Not on file    Highest education level: Not on file   Occupational History    Not on file   Tobacco Use    Smoking status: Never Smoker    Smokeless tobacco: Never Used   Vaping Use    Vaping Use: Never used   Substance and Sexual Activity    Alcohol use: Not on file    Drug use: Not on file    Sexual activity: Not on file   Other Topics Concern    Not on file   Social History Narrative    Not on file     Social Determinants of Health     Financial Resource Strain:     Difficulty of Paying Living Expenses:    Food Insecurity:     Worried About Running Out of Food in the Last Year:     [address] in the Last Year:    Transportation Needs:     Lack of Transportation (Medical):  Lack of Transportation (Non-Medical):    Physical Activity:     Days of Exercise per Week:     Minutes of Exercise per Session:    Stress:     Feeling of Stress :    Social Connections:     Frequency of Communication with Friends and Family:     Frequency of Social Gatherings with Friends and Family:     Attends Taoism Services:     Active Member of Clubs or Organizations:     Attends Club or Organization Meetings:     Marital Status:    Intimate Partner Violence:     Fear of Current or Ex-Partner:     Emotionally Abused:     Physically Abused:     Sexually Abused:        Physical Examination     The following were examined and determined to be normal: Psych/Neuro. The following were examined and determined to be abnormal: Scalp/hair and Head/face. Vance phototype: 2    -Constitutional: Well appearing, no acute distress  -Neurological: Alert and oriented X 3  -Mood and Affect: Pleasant  Areas of skin examined as listed above:   1. ill defined irreg shaped gritty keratotic pink macule(s) papules scattered to occipital scalp (3), vertex scalp (3), crown of scalp, helix of left ear, nose (4), right inferior cheek, forehead (4), right preauricular cheek, left preauricular cheek (2). Significant actinic damage/change remains to scalp and face and ears. 2.  Left posterior vertex scalp-pink macule at site of biopsied SCC    Assessment and Plan     1. Multiple actinic keratoses    2. Squamous cell carcinoma of scalp        1.  Multiple actinic keratoses  -Edu re: relationship with chronic cumulative sun exposure, low

## 2021-05-18 NOTE — PATIENT INSTRUCTIONS

## 2021-06-14 ENCOUNTER — PROCEDURE VISIT (OUTPATIENT)
Dept: SURGERY | Age: 81
End: 2021-06-14
Payer: MEDICARE

## 2021-06-14 VITALS — DIASTOLIC BLOOD PRESSURE: 70 MMHG | SYSTOLIC BLOOD PRESSURE: 117 MMHG | TEMPERATURE: 97.5 F

## 2021-06-14 DIAGNOSIS — C44.42 SQUAMOUS CELL CARCINOMA OF SCALP: Primary | ICD-10-CM

## 2021-06-14 PROCEDURE — 12032 INTMD RPR S/A/T/EXT 2.6-7.5: CPT | Performed by: DERMATOLOGY

## 2021-06-14 PROCEDURE — 17311 MOHS 1 STAGE H/N/HF/G: CPT | Performed by: DERMATOLOGY

## 2021-06-14 NOTE — PROGRESS NOTES
MOHS PROCEDURE NOTE    PHYSICIAN:  Raman Koenig. Chikis Irene MD    ASSISTANT: Saul Salazar RN      REFERRING PROVIDER:  Narda Cespedes D.O.    PREOPERATIVE DIAGNOSIS: Invasive moderately-differentiated Squamous Cell Carcinoma     SPECIFIC MOHS INDICATIONS:  location    AUC SCORIN/9    POSTOPERATIVE DIAGNOSIS: SAME    LOCATION: Left posterior vertex scalp    OPERATIVE PROCEDURE:  MOHS MICROGRAPHIC SURGERY    RECONSTRUCTION OF DEFECT: Intermediate layered closure    PREOPERATIVE SIZE: 10x8 MM    DEFECT SIZE: 14x12 MM    LENGTH OF REPAIRED WOUND/SIZE OF FLAP/SIZE OF GRAFT:  40 MM    ANESTHESIA: 10 mL 1% lidocaine with epinephrine 1:100,000 buffered. EBL:  MINIMAL    DURATION OF PROCEDURE:  0.75 HOUR    POSTOPERATIVE OBSERVATION: 0.75 HOUR    SPECIMENS:  SEE MOHS MAP    COMPLICATIONS:  NONE    DESCRIPTION OF PROCEDURE:  The patient was given a mirror, as appropriate, and the biopsy site was identified, marked with a surgical marking pen, and verified by the patient. Options for treatment were discussed and the patient was informed that Mohs surgery was the selected treatment based on its lower recurrence rate, given the features listed above, as compared to other treatment modalities such as excision, radiation, or curettage, and agreed with this treatment plan. Risks and benefits including bruising, swelling, bleeding, infection, nerve injury, recurrence, and scarring were discussed with the patient prior to the procedure and a written consent detailing these and other risks was reviewed with the patient and signed. There was a time out for person and procedure verification. The surgical site was prepped with an antiseptic solution. Application of an antiseptic solution was repeated before each surgical stage. Stage I:  The clinically-apparent tumor was carefully defined and debulked, determining the edge of the surgical excision.     A thin layer of tumor-laden tissue was excised with a narrow margin of normal-appearing skin, using the technique of Mohs. A map was prepared to correspond to the area of skin from which it was excised. Hemostasis was achieved using electrosurgery. The wound was bandaged. The tissue was prepared for the cryostat and sectioned. 1 section(s) prepared. Each section was coded, cut, and stained for microscopic examination. The entire base and margins of the excised piece of tissue were examined by the surgeon. The tissue was examined to the level of subcut fat. No tumor was identified at the peripheral margins of stage I of microscopically controlled surgery. DEFECT MANAGEMENT:    REPAIR DESCRIPTION:  Various closure modalities were discussed with the patient, and it was decided that an intermediate layered repair would best preserve normal anatomic and functional relationships. Additional risk of wound dehiscence was discussed. The area was anesthetized with 1% lidocaine with epinephrine 1:100,000 buffered, was given a sterile prep using Chlorhexidine gluconate 4% solution and draped in the usual sterile fashion. Recreation and enlargement of the wound was performed by excising cones of tissue via the triangulation technique. The final incision lines were placed with respect for the patient's natural skin tension lines in a linear configuration to avoid functional and aesthetic distortion of adjacent free margins. Following minimal undermining, meticulous hemostasis was obtained with spot monopolar electrocoagulation. Subcutaneous dead space and dermis were closed using 4-0 Vicryl buried subcutaneous interrupted suture and the epidermis was approximated with 4-0 Prolene simple interrupted stitches. WOUND COVERAGE:  The wound was cleaned with normal saline solution, dried off, Aquaphor ointment was applied, and the wound was covered. A dressing was applied for stabilization and light pressure.   The patient was given detailed oral and written instructions on postoperative care. There were no complications. The patient left the Unit in good medical condition. FOLLOW-UP:  The patient will return for suture removal in 14-21 days.

## 2021-06-14 NOTE — PROGRESS NOTES
PRE-PROCEDURE SCREENING    Pacemaker/ICD: No  Difficulty with numbing in the past: No  Local Anesthesia Reaction/passing out: No  Latex or adhesive allergy:  No  Bleeding/Clotting Disorders: No  Anticoagulant Therapy: Yes, asa 325 mg  Joint prosthesis: Yes, right knee done Feb 2021 and L knee approx 2010  Artificial Heart Valve: No  Stroke or Seizures: No  Organ Transplant or Lymphoma: No  Immunosuppression: No  Respiratory Problems: No

## 2021-06-14 NOTE — PATIENT INSTRUCTIONS
[facility] Office Hours:    Monday-Thursday  7:30 AM-4:30 PM    Friday  9:00 AM-1:00 PM      POST-OPERATIVE CARE FOR STICHES  Bandage change after 48 hours    CARING FOR YOUR SURGICAL SITE  The bandage should remain on and completely dry for 48 hours. Do NOT get the bandage wet. 1. After the first 48 hours, gently remove the remaining part of the bandage. It can be helpful to moisten the bandage edges in the shower. Steri strips may still be on the wound. It is ok, they will fall off slowly with the daily bandage changes. 2. Gently clean the wound daily with mild soap and water. Try to clean off crust and debris. 3. Dry (pat) the area with a clean Q-tip or gauze. 4. Apply a layer of Vaseline/ Aquaphor (or Bacitracin if your doctor recommends) to the wound area only. 5. Cut a piece of Telfa (or any non-stick dressing) to fit just over the wound and secure it with paper tape. If the wound is small you may use a Band- Aid. Keep area covered for a total of 2-3 week(s). If the dressing comes off or if you have questions, or concerns about the dressing, please call the office for instructions! POST OPERATIVE INSTRUCTIONS    1. Activity: Do not lift anything heavier than a gallon of milk for 1 week. Also, avoid strenuous activity such as running, power walking or contact sports. 2. Eating and drinking: Do not drink alcohol for 48 hours after your procedure. Alcohol increases the chances of bleeding. 3. Medicines   -If you have discomfort, take Acetaminophen (Tylenol or Extra Strength Tylenol). Follow the instructions and warning on the bottle. -If your doctor has prescribed you an Aspirin daily, please keep taking it. Do not take extra Aspirin or medicines containing Aspirin (such as Jaylin-[place] and Excedrin) for 48 hours after your procedure. Bleeding: If bleeding occurs, DO NOT remove the bandage. Put firm pressure on the area with gauze for 20 minutes without peeking.  If the suddenly.

## 2021-06-15 ENCOUNTER — TELEPHONE (OUTPATIENT)
Dept: SURGERY | Age: 81
End: 2021-06-15

## 2021-07-01 ENCOUNTER — NURSE ONLY (OUTPATIENT)
Dept: SURGERY | Age: 81
End: 2021-07-01

## 2021-07-01 VITALS — TEMPERATURE: 97.3 F

## 2021-07-01 DIAGNOSIS — Z48.02 VISIT FOR SUTURE REMOVAL: Primary | ICD-10-CM

## 2021-07-01 NOTE — PROGRESS NOTES
S:  The patient is here for suture removal s/p Mohs surgery on the left posterior vertex scalp and Intermediate layered closure repair, 17 days ago. The site appears well-healed without signs of infection (redness, pain or discharge). The sutures were removed. Daily Wound care and activity instructions given. The patient was scheduled for follow-up prn for scar/wound check. The patient was scheduled for f/u with General Dermatology per their instructions.

## 2021-07-01 NOTE — PATIENT INSTRUCTIONS
[facility] Office Hours:    Monday-Thursday  7:30 AM-4:30 PM    Friday  9:00 AM-1:00 PM      PT's sutures removed. Area is noted clean/dry, no redness, hot to touch or drainage, no sign of infection. Vaseline was applied. PT was advised to call w/any questions/concerns, i.e, hot or red to touch, fever/chills/sweats for his body, any drainage. PT understood all info given and will call w/any questions/concerns.

## 2021-10-21 ENCOUNTER — TELEPHONE (OUTPATIENT)
Dept: ORTHOPEDIC SURGERY | Age: 81
End: 2021-10-21
